# Patient Record
Sex: MALE | Race: WHITE | NOT HISPANIC OR LATINO | Employment: OTHER | URBAN - METROPOLITAN AREA
[De-identification: names, ages, dates, MRNs, and addresses within clinical notes are randomized per-mention and may not be internally consistent; named-entity substitution may affect disease eponyms.]

---

## 2017-08-07 ENCOUNTER — HOSPITAL ENCOUNTER (EMERGENCY)
Facility: MEDICAL CENTER | Age: 57
End: 2017-08-08
Attending: EMERGENCY MEDICINE
Payer: COMMERCIAL

## 2017-08-07 DIAGNOSIS — R51.9 ACUTE NONINTRACTABLE HEADACHE, UNSPECIFIED HEADACHE TYPE: ICD-10-CM

## 2017-08-07 DIAGNOSIS — H66.001 ACUTE SUPPURATIVE OTITIS MEDIA OF RIGHT EAR WITHOUT SPONTANEOUS RUPTURE OF TYMPANIC MEMBRANE, RECURRENCE NOT SPECIFIED: ICD-10-CM

## 2017-08-07 LAB
BASOPHILS # BLD AUTO: 0.4 % (ref 0–1.8)
BASOPHILS # BLD: 0.02 K/UL (ref 0–0.12)
EOSINOPHIL # BLD AUTO: 0.09 K/UL (ref 0–0.51)
EOSINOPHIL NFR BLD: 1.6 % (ref 0–6.9)
ERYTHROCYTE [DISTWIDTH] IN BLOOD BY AUTOMATED COUNT: 38.9 FL (ref 35.9–50)
HCT VFR BLD AUTO: 44.4 % (ref 42–52)
HGB BLD-MCNC: 15.7 G/DL (ref 14–18)
IMM GRANULOCYTES # BLD AUTO: 0.02 K/UL (ref 0–0.11)
IMM GRANULOCYTES NFR BLD AUTO: 0.4 % (ref 0–0.9)
LYMPHOCYTES # BLD AUTO: 0.97 K/UL (ref 1–4.8)
LYMPHOCYTES NFR BLD: 17.6 % (ref 22–41)
MCH RBC QN AUTO: 30.7 PG (ref 27–33)
MCHC RBC AUTO-ENTMCNC: 35.4 G/DL (ref 33.7–35.3)
MCV RBC AUTO: 86.7 FL (ref 81.4–97.8)
MONOCYTES # BLD AUTO: 0.61 K/UL (ref 0–0.85)
MONOCYTES NFR BLD AUTO: 11.1 % (ref 0–13.4)
NEUTROPHILS # BLD AUTO: 3.81 K/UL (ref 1.82–7.42)
NEUTROPHILS NFR BLD: 68.9 % (ref 44–72)
NRBC # BLD AUTO: 0 K/UL
NRBC BLD AUTO-RTO: 0 /100 WBC
PLATELET # BLD AUTO: 127 K/UL (ref 164–446)
PMV BLD AUTO: 9.1 FL (ref 9–12.9)
RBC # BLD AUTO: 5.12 M/UL (ref 4.7–6.1)
WBC # BLD AUTO: 5.5 K/UL (ref 4.8–10.8)

## 2017-08-07 PROCEDURE — 99283 EMERGENCY DEPT VISIT LOW MDM: CPT

## 2017-08-07 PROCEDURE — 85652 RBC SED RATE AUTOMATED: CPT

## 2017-08-07 PROCEDURE — 80048 BASIC METABOLIC PNL TOTAL CA: CPT

## 2017-08-07 PROCEDURE — 85025 COMPLETE CBC W/AUTO DIFF WBC: CPT

## 2017-08-07 PROCEDURE — 36415 COLL VENOUS BLD VENIPUNCTURE: CPT

## 2017-08-07 PROCEDURE — 86140 C-REACTIVE PROTEIN: CPT

## 2017-08-07 ASSESSMENT — PAIN SCALES - GENERAL: PAINLEVEL_OUTOF10: 0

## 2017-08-07 NOTE — ED AVS SNAPSHOT
Home Care Instructions                                                                                                                Leo Laughlin   MRN: [id number]    Department:  [facility], Emergency Dept   Date of Visit:  8/7/2017            [facility], Emergency Dept    [address] [place] [address]    Phone:  [phone]      You were seen by     Fred Keenan M.D.      Your Diagnosis Was     Acute nonintractable headache, unspecified headache type     R51       Follow-up Information     1. Follow up with Jackeline Jung D.O. In 2 days.    Specialty:  Family Medicine    Contact information    [address] [facility] [address]  [place] [address] [phone]          2. Follow up with [facility], Emergency Dept.    Specialty:  Emergency Medicine    Why:  immediately if symptoms worsen    Contact information    [address] [place] [address] [address] [phone]      Medication Information     Review all of your home medications and newly ordered medications with your primary doctor and/or pharmacist as soon as possible. Follow medication instructions as directed by your doctor and/or pharmacist.     Please keep your complete medication list with you and share with your physician. Update the information when medications are discontinued, doses are changed, or new medications (including over-the-counter products) are added; and carry medication information at all times in the event of emergency situations.               Medication List      START taking these medications        Instructions    Morning Afternoon Evening Bedtime    amoxicillin-clavulanate 875-125 MG Tabs   Commonly known as:  AUGMENTIN        Take 1 Tab by mouth 2 times a day for 10 days.   Dose:  1 Tab                        tramadol 50 MG Tabs   Commonly known as:  ULTRAM        Take 1-2 Tabs by mouth every four hours as needed.   Dose:   mg                         ASK your doctor about these medications        Instructions    Morning Afternoon Evening Bedtime    diazepam 5 MG Tabs   Commonly known as:  VALIUM        Take 1 Tab by mouth every 8 hours as needed (muscle spasm).   Dose:  5 mg                        hydrocodone-acetaminophen 5-500 MG Tabs   Commonly known as:  VICODIN        Take 1-2 Tabs by mouth every four hours as needed. Indications: Moderate to Moderately Severe Pain   Dose:  1-2 Tab                        ibuprofen 600 MG Tabs   Commonly known as:  MOTRIN        Take 1 Tab by mouth every 6 hours as needed for Mild Pain.   Dose:  600 mg                             Where to Get Your Medications      You can get these medications from any pharmacy     Bring a paper prescription for each of these medications    - amoxicillin-clavulanate 875-125 MG Tabs  - tramadol 50 MG Tabs            Procedures and tests performed during your visit     BASIC METABOLIC PANEL    CBC WITH DIFFERENTIAL    CRP QUANTITIVE (NON-CARDIAC)    ESTIMATED GFR    WESTERGREN SED RATE        Discharge Instructions       Otitis Media, Adult  Otitis media is redness, soreness, and puffiness (swelling) in the space just behind your eardrum (middle ear). It may be caused by allergies or infection. It often happens along with a cold.  HOME CARE  · Take your medicine as told. Finish it even if you start to feel better.  · Only take over-the-counter or prescription medicines for pain, discomfort, or fever as told by your doctor.  · Follow up with your doctor as told.  GET HELP IF:  · You have otitis media only in one ear, or bleeding from your nose, or both.  · You notice a lump on your neck.  · You are not getting better in 3-5 days.  · You feel worse instead of better.  GET HELP RIGHT AWAY IF:   · You have pain that is not helped with medicine.  · You have puffiness, redness, or pain around your ear.  · You get a stiff neck.  · You cannot move part of your face  (paralysis).  · You notice that the bone behind your ear hurts when you touch it.  MAKE SURE YOU:   · Understand these instructions.  · Will watch your condition.  · Will get help right away if you are not doing well or get worse.     This information is not intended to replace advice given to you by your health care provider. Make sure you discuss any questions you have with your health care provider.     Document Released: 06/05/2009 Document Revised: 01/08/2016 Document Reviewed: 07/15/2014  [employer] Interactive Patient Education ©2016 [employer] Inc.    General Headache Without Cause  A general headache is pain or discomfort felt around the head or neck area. The cause may not be found.   HOME CARE   · Keep all doctor visits.  · Only take medicines as told by your doctor.  · Lie down in a dark, quiet room when you have a headache.  · Keep a journal to find out if certain things bring on headaches. For example, write down:  ¨ What you eat and drink.  ¨ How much sleep you get.  ¨ Any change to your diet or medicines.  · Relax by getting a massage or doing other relaxing activities.  · Put ice or heat packs on the head and neck area as told by your doctor.  · Lessen stress.  · Sit up straight. Do not tighten (tense) your muscles.  · Quit smoking if you smoke.  · Lessen how much alcohol you drink.  · Lessen how much caffeine you drink, or stop drinking caffeine.  · Eat and sleep on a regular schedule.  · Get 7 to 9 hours of sleep, or as told by your doctor.  · Keep lights dim if bright lights bother you or make your headaches worse.  GET HELP RIGHT AWAY IF:   · Your headache becomes really bad.  · You have a fever.  · You have a stiff neck.  · You have trouble seeing.  · Your muscles are weak, or you lose muscle control.  · You lose your balance or have trouble walking.  · You feel like you will pass out (faint), or you pass out.  · You have really bad symptoms that are different than your first symptoms.  · You have  problems with the medicines given to you by your doctor.  · Your medicines do not work.  · Your headache feels different than the other headaches.  · You feel sick to your stomach (nauseous) or throw up (vomit).     This information is not intended to replace advice given to you by your health care provider. Make sure you discuss any questions you have with your health care provider.     Document Released: 09/26/2009 Document Revised: 05/03/2016 Document Reviewed: 12/07/2012  [employer] Interactive Patient Education ©2016 [employer]            Patient Information     Patient Information    Following emergency treatment: all patient requiring follow-up care must return either to a private physician or a clinic if your condition worsens before you are able to obtain further medical attention, please return to the emergency room.     Billing Information    At [facility], we work to make the billing process streamlined for our patients.  Our Representatives are here to answer any questions you may have regarding your hospital bill.  If you have insurance coverage and have supplied your insurance information to us, we will submit a claim to your insurer on your behalf.  Should you have any questions regarding your bill, we can be reached online or by phone as follows:  Online: You are able pay your bills online or live chat with our representatives about any billing questions you may have. We are here to help Monday - Friday from 8:00am to 7:30pm and 9:00am - 12:00pm on Saturdays.  Please visit [email or web address]  for more information.   Phone:  [phone] or [phone]    Please note that your emergency physician, surgeon, pathologist, radiologist, anesthesiologist, and other specialists are not employed by [facility] and will therefore bill separately for their services.  Please contact them directly for any questions concerning their bills at the numbers below:     Emergency  Physician Services:  [phone]  [facility]:  [phone]  [facility]:  [phone]  [facility]:  [phone]    1. Your final bill may vary from the amount quoted upon discharge if all procedures are not complete at that time, or if your doctor has additional procedures of which we are not aware. You will receive an additional bill if you return to the Emergency Department at [facility] for suture removal regardless of the facility of which the sutures were placed.     2. Please arrange for settlement of this account at the emergency registration.    3. All self-pay accounts are due in full at the time of treatment.  If you are unable to meet this obligation then payment is expected within 4-5 days.     4. If you have had radiology studies (CT, X-ray, Ultrasound, MRI), you have received a preliminary result during your emergency department visit. Please contact the radiology department [phone] to receive a copy of your final result. Please discuss the Final result with your primary physician or with the follow up physician provided.     Crisis Hotline:  [place] Crisis Hotline:  6-399-HHBEOAI or 1-591.458.1532  [place] Crisis Hotline:    1-820.320.1659 or 255-254-0949         ED Discharge Follow Up Questions    1. In order to provide you with very good care, we would like to follow up with a phone call in the next few days.  May we have your permission to contact you?     YES /  NO    2. What is the best phone number to call you? (       )_____-__________    3. What is the best time to call you?      Morning  /  Afternoon  /  Evening                   Patient Signature:  ____________________________________________________________    Date:  ____________________________________________________________

## 2017-08-07 NOTE — ED AVS SNAPSHOT
8/8/2017    Leo Laughlin  [address]  [place] [address]    Dear Leo:    [facility] wants to ensure your discharge home is safe and you or your loved ones have had all of your questions answered regarding your care after you leave the hospital.    Below is a list of resources and contact information should you have any questions regarding your hospital stay, follow-up instructions, or active medical symptoms.    Questions or Concerns Regarding… Contact   Medical Questions Related to Your Discharge  (7 days a week, 8am-5pm) Contact a Nurse Care Coordinator   [phone]   Medical Questions Not Related to Your Discharge  (24 hours a day / 7 days a week)  Contact the Nurse Health Line   [phone]    Medications or Discharge Instructions Refer to your discharge packet   or contact your [facility] Primary Care Provider   [phone]   Follow-up Appointment(s) Schedule your appointment via Vastari   or contact Scheduling [phone]   Billing Review your statement via Vastari  or contact Billing [phone]   Medical Records Review your records via Vastari   or contact Medical Records [phone]     You may receive a telephone call within two days of discharge. This call is to make certain you understand your discharge instructions and have the opportunity to have any questions answered. You can also easily access your medical information, test results and upcoming appointments via the Vastari free online health management tool. You can learn more and sign up at [email or web address]. For assistance setting up your Vastari account, please call [phone].    Once again, we want to ensure your discharge home is safe and that you have a clear understanding of any next steps in your care. If you have any questions or concerns, please do not hesitate to contact us, we are here for you. Thank you for choosing [facility] for your healthcare needs.    Sincerely,    Your [facility] Healthcare Team

## 2017-08-07 NOTE — ED AVS SNAPSHOT
MedMark Services Access Code: [email or web address]  Expires: 9/7/2017  2:51 AM    Your email address is not on file at [email or web address].  Email Addresses are required for you to sign up for [employer] Services, please contact [phone] to verify your personal information and to provide your email address prior to attempting to register for Med[employer] Services.    Leo Donovan Truman  [address] [place] [address]    MedMark Services  A secure, online tool to manage your health information     [email or web address]’s MedMark Services® is a secure, online tool that connects you to your personalized health information from the privacy of your home -- day or night - making it very easy for you to manage your healthcare. Once the activation process is completed, you can even access your medical information using the MedMark Services lg, which is available for free in the Apple Lg store or Google Play store.     To learn more about MedMark Services, visit [email or web address]    There are two levels of access available (as shown below):   My Chart Features  [facility] Primary Care Doctor [facility]  Specialists [facility]  Urgent  Care Non-[facility] Primary Care Doctor   Email your healthcare team securely and privately 24/7 X X X    Manage appointments: schedule your next appointment; view details of past/upcoming appointments X      Request prescription refills. X      View recent personal medical records, including lab and immunizations X X X X   View health record, including health history, allergies, medications X X X X   Read reports about your outpatient visits, procedures, consult and ER notes X X X X   See your discharge summary, which is a recap of your hospital and/or ER visit that includes your diagnosis, lab results, and care plan X X  X     How to register for MedMark Services:  Once your e-mail address has been verified, follow the following steps to sign up for MedMark Services.     1. Go to  [email or web address]  2. Click on the Sign Up Now box, which takes you to the New Member Sign Up page.  You will need to provide the following information:  a. Enter your Inte[employer] Access Code exactly as it appears at the top of this page. (You will not need to use this code after you’ve completed the sign-up process. If you do not sign up before the expiration date, you must request a new code.)   b. Enter your date of birth.   c. Enter your home email address.   d. Click Submit, and follow the next screen’s instructions.  3. Create a [employer] ID. This will be your Integromics login ID and cannot be changed, so think of one that is secure and easy to remember.  4. Create a Integromics password. You can change your password at any time.  5. Enter your Password Reset Question and Answer. This can be used at a later time if you forget your password.   6. Enter your e-mail address. This allows you to receive e-mail notifications when new information is available in Integromics.  7. Click Sign Up. You can now view your health information.    For assistance activating your Inte[employer]ics account, call [phone]

## 2017-08-08 VITALS
RESPIRATION RATE: 18 BRPM | HEART RATE: 71 BPM | DIASTOLIC BLOOD PRESSURE: 84 MMHG | HEIGHT: 70 IN | SYSTOLIC BLOOD PRESSURE: 128 MMHG | WEIGHT: 271.83 LBS | BODY MASS INDEX: 38.92 KG/M2 | TEMPERATURE: 96.9 F | OXYGEN SATURATION: 91 %

## 2017-08-08 LAB
ANION GAP SERPL CALC-SCNC: 8 MMOL/L (ref 0–11.9)
BUN SERPL-MCNC: 12 MG/DL (ref 8–22)
CALCIUM SERPL-MCNC: 9.1 MG/DL (ref 8.4–10.2)
CHLORIDE SERPL-SCNC: 102 MMOL/L (ref 96–112)
CO2 SERPL-SCNC: 26 MMOL/L (ref 20–33)
CREAT SERPL-MCNC: 1.01 MG/DL (ref 0.5–1.4)
CRP SERPL HS-MCNC: 2.06 MG/DL (ref 0–0.75)
ERYTHROCYTE [SEDIMENTATION RATE] IN BLOOD BY WESTERGREN METHOD: 20 MM/HOUR (ref 0–20)
GFR SERPL CREATININE-BSD FRML MDRD: >60 ML/MIN/1.73 M 2
GLUCOSE SERPL-MCNC: 150 MG/DL (ref 65–99)
POTASSIUM SERPL-SCNC: 3.7 MMOL/L (ref 3.6–5.5)
SODIUM SERPL-SCNC: 136 MMOL/L (ref 135–145)

## 2017-08-08 PROCEDURE — 700102 HCHG RX REV CODE 250 W/ 637 OVERRIDE(OP): Performed by: EMERGENCY MEDICINE

## 2017-08-08 PROCEDURE — A9270 NON-COVERED ITEM OR SERVICE: HCPCS | Performed by: EMERGENCY MEDICINE

## 2017-08-08 RX ORDER — AMOXICILLIN AND CLAVULANATE POTASSIUM 875; 125 MG/1; MG/1
1 TABLET, FILM COATED ORAL 2 TIMES DAILY
Qty: 20 TAB | Refills: 0 | Status: SHIPPED | OUTPATIENT
Start: 2017-08-08 | End: 2017-08-18

## 2017-08-08 RX ORDER — AMOXICILLIN AND CLAVULANATE POTASSIUM 875; 125 MG/1; MG/1
1 TABLET, FILM COATED ORAL ONCE
Status: COMPLETED | OUTPATIENT
Start: 2017-08-08 | End: 2017-08-08

## 2017-08-08 RX ORDER — TRAMADOL HYDROCHLORIDE 50 MG/1
50-100 TABLET ORAL EVERY 4 HOURS PRN
Qty: 31 TAB | Refills: 0 | Status: SHIPPED | OUTPATIENT
Start: 2017-08-08 | End: 2021-06-22

## 2017-08-08 RX ADMIN — AMOXICILLIN AND CLAVULANATE POTASSIUM 1 TABLET: 875; 125 TABLET, FILM COATED ORAL at 02:53

## 2017-08-08 ASSESSMENT — PAIN SCALES - GENERAL: PAINLEVEL_OUTOF10: 2

## 2017-08-08 NOTE — DISCHARGE INSTRUCTIONS
Otitis Media, Adult  Otitis media is redness, soreness, and puffiness (swelling) in the space just behind your eardrum (middle ear). It may be caused by allergies or infection. It often happens along with a cold.  HOME CARE  · Take your medicine as told. Finish it even if you start to feel better.  · Only take over-the-counter or prescription medicines for pain, discomfort, or fever as told by your doctor.  · Follow up with your doctor as told.  GET HELP IF:  · You have otitis media only in one ear, or bleeding from your nose, or both.  · You notice a lump on your neck.  · You are not getting better in 3-5 days.  · You feel worse instead of better.  GET HELP RIGHT AWAY IF:   · You have pain that is not helped with medicine.  · You have puffiness, redness, or pain around your ear.  · You get a stiff neck.  · You cannot move part of your face (paralysis).  · You notice that the bone behind your ear hurts when you touch it.  MAKE SURE YOU:   · Understand these instructions.  · Will watch your condition.  · Will get help right away if you are not doing well or get worse.     This information is not intended to replace advice given to you by your health care provider. Make sure you discuss any questions you have with your health care provider.     Document Released: 06/05/2009 Document Revised: 01/08/2016 Document Reviewed: 07/15/2014  [employer] Interactive Patient Education ©2016 [employer].    General Headache Without Cause  A general headache is pain or discomfort felt around the head or neck area. The cause may not be found.   HOME CARE   · Keep all doctor visits.  · Only take medicines as told by your doctor.  · Lie down in a dark, quiet room when you have a headache.  · Keep a journal to find out if certain things bring on headaches. For example, write down:  ¨ What you eat and drink.  ¨ How much sleep you get.  ¨ Any change to your diet or medicines.  · Relax by getting a massage or doing other relaxing  activities.  · Put ice or heat packs on the head and neck area as told by your doctor.  · Lessen stress.  · Sit up straight. Do not tighten (tense) your muscles.  · Quit smoking if you smoke.  · Lessen how much alcohol you drink.  · Lessen how much caffeine you drink, or stop drinking caffeine.  · Eat and sleep on a regular schedule.  · Get 7 to 9 hours of sleep, or as told by your doctor.  · Keep lights dim if bright lights bother you or make your headaches worse.  GET HELP RIGHT AWAY IF:   · Your headache becomes really bad.  · You have a fever.  · You have a stiff neck.  · You have trouble seeing.  · Your muscles are weak, or you lose muscle control.  · You lose your balance or have trouble walking.  · You feel like you will pass out (faint), or you pass out.  · You have really bad symptoms that are different than your first symptoms.  · You have problems with the medicines given to you by your doctor.  · Your medicines do not work.  · Your headache feels different than the other headaches.  · You feel sick to your stomach (nauseous) or throw up (vomit).     This information is not intended to replace advice given to you by your health care provider. Make sure you discuss any questions you have with your health care provider.     Document Released: 09/26/2009 Document Revised: 05/03/2016 Document Reviewed: 12/07/2012  [employer] Interactive Patient Education ©2016 [employer].

## 2017-08-08 NOTE — ED PROVIDER NOTES
ED Provider Note    ED Provider Note      Primary care provider: Jackeline Jung D.O.    CHIEF COMPLAINT  Chief Complaint   Patient presents with   • Sent from Urgent Care   • Fever       HPI  Leo Laughlin is a 56 y.o. male who presents to the Emergency Department chief complaint of fever and headache. Patient reports he was seen in urgent care center earlier they were concerned with possible temporal arteritis and is instructed to present here for further evaluation. Patient's been having intermittent headaches for the last 2 days his headache are primarily right-sided breast retrobulbar with some radiation in the right temporal area of the right ear. Patient had no dizziness no altered mental status he reports minimal intermittent neck pain not currently experiencing this. He's had no vision changes no hearing changes he reports no jaw claudication or pain with eating he's had no pain over the right side of his head or forehead/face. Minor nauseous and has no vomiting no cough congestion chest pain shortness of breath no abdominal pain constipation diarrhea no other acute concerns at this time.      REVIEW OF SYSTEMS  10 systems reviewed and otherwise negative, pertinent positives and negatives listed in the history of present illness.      PAST MEDICAL HISTORY    none    SURGICAL HISTORY   has past surgical history that includes arthroscopy, knee.    SOCIAL HISTORY  Social History   Substance Use Topics   • Smoking status: Former Smoker -- 2.00 packs/day for 30 years     Types: Cigarettes   • Smokeless tobacco: None      Comment: 13 years ago   • Alcohol Use: No      History   Drug Use No       FAMILY HISTORY  Non-Contributory    CURRENT MEDICATIONS  Home Medications     Reviewed by Rolando Eastman R.N. (Registered Nurse) on 08/07/17 at 2023  Med List Status: Complete    Medication Last Dose Status    diazepam (VALIUM) 5 MG TABS not taking Active    hydrocodone-acetaminophen (VICODIN) 5-500 MG TABS not  "taking Active    ibuprofen (MOTRIN) 600 MG TABS  Active                ALLERGIES  No Known Allergies    PHYSICAL EXAM  VITAL SIGNS: /84 mmHg  Pulse 75  Temp(Src) 36.1 °C (96.9 °F)  Resp 18  Ht 1.778 m (5' 10\")  Wt 123.3 kg (271 lb 13.2 oz)  BMI 39.00 kg/m2  SpO2 94%  Pulse ox interpretation: I interpret this pulse ox as normal.  Constitutional: Alert and oriented x 3, Distress  HEENT: Atraumatic normocephalic, no tenderness to palpation of the right temple or right facial structures, pupils are equal round reactive to light extraocular movements are intact no scleral injection. The nares shows minor congestion and rhinorrhea, external ears are normal, the left tympanic membrane is somewhat retracted however clear reflective left external auditory canal unremarkable. The right tympanic membrane is retracted and opacified somewhat bulging with erythematous external auditory canal, mouth shows moist mucous membranes, minimal posterior pharyngeal erythema and tonsillar edema  Neck: Supple, no JVD no tracheal deviation  Cardiovascular: Regular rate and rhythm no murmur rub or gallop 2+ pulses peripherally x4  Thorax & Lungs: No respiratory distress, no wheezes rales or rhonchi, No chest tenderness.   GI: Soft nontender nondistended positive bowel sounds, no peritoneal signs  Skin: Warm dry no acute rash or lesion  Musculoskeletal: Moving all extremities with full range and 5 of 5 strength, no acute  deformity  Neurologic: Cranial nerves III through XII are grossly intact, no sensory deficit, no cerebellar dysfunction   Psychiatric: Appropriate affect for situation at this time      DIAGNOSTIC STUDIES / PROCEDURES  LABS  Results for orders placed or performed during the hospital encounter of 08/07/17   CBC WITH DIFFERENTIAL   Result Value Ref Range    WBC 5.5 4.8 - 10.8 K/uL    RBC 5.12 4.70 - 6.10 M/uL    Hemoglobin 15.7 14.0 - 18.0 g/dL    Hematocrit 44.4 42.0 - 52.0 %    MCV 86.7 81.4 - 97.8 fL    MCH 30.7 " 27.0 - 33.0 pg    MCHC 35.4 (H) 33.7 - 35.3 g/dL    RDW 38.9 35.9 - 50.0 fL    Platelet Count 127 (L) 164 - 446 K/uL    MPV 9.1 9.0 - 12.9 fL    Neutrophils-Polys 68.90 44.00 - 72.00 %    Lymphocytes 17.60 (L) 22.00 - 41.00 %    Monocytes 11.10 0.00 - 13.40 %    Eosinophils 1.60 0.00 - 6.90 %    Basophils 0.40 0.00 - 1.80 %    Immature Granulocytes 0.40 0.00 - 0.90 %    Nucleated RBC 0.00 /100 WBC    Neutrophils (Absolute) 3.81 1.82 - 7.42 K/uL    Lymphs (Absolute) 0.97 (L) 1.00 - 4.80 K/uL    Monos (Absolute) 0.61 0.00 - 0.85 K/uL    Eos (Absolute) 0.09 0.00 - 0.51 K/uL    Baso (Absolute) 0.02 0.00 - 0.12 K/uL    Immature Granulocytes (abs) 0.02 0.00 - 0.11 K/uL    NRBC (Absolute) 0.00 K/uL   BASIC METABOLIC PANEL   Result Value Ref Range    Sodium 136 135 - 145 mmol/L    Potassium 3.7 3.6 - 5.5 mmol/L    Chloride 102 96 - 112 mmol/L    Co2 26 20 - 33 mmol/L    Glucose 150 (H) 65 - 99 mg/dL    Bun 12 8 - 22 mg/dL    Creatinine 1.01 0.50 - 1.40 mg/dL    Calcium 9.1 8.4 - 10.2 mg/dL    Anion Gap 8.0 0.0 - 11.9   CRP QUANTITIVE (NON-CARDIAC)   Result Value Ref Range    Stat C-Reactive Protein 2.06 (H) 0.00 - 0.75 mg/dL   WESTERGREN SED RATE   Result Value Ref Range    Sed Rate Westergren 20 0 - 20 mm/hour   ESTIMATED GFR   Result Value Ref Range    GFR If African American >60 >60 mL/min/1.73 m 2    GFR If Non African American >60 >60 mL/min/1.73 m 2       All labs reviewed by me.          COURSE & MEDICAL DECISION MAKING  Pertinent Labs & Imaging studies reviewed. (See chart for details)        Medical Decision Making: Patient sent from urgent care for evaluation possible temporal arteritis. Patient has no changes of the eye no changes of the vision he has no jaw claudication no tenderness over the right temporal artery distribution. CRP is very slightly elevated ESR is normal. Patient does have evidence of an acute right otitis media likely responsible for her symptoms. We discussed further evaluation of temporal  "arteritis would be performed by temporal artery biopsy which I do not feel is indicated at this time. Patient is also had some headache and some fever. Likely both resulting from acute otitis media no nuchal rigidity or other meningeal signs at this time we discussed lumbar puncture however patient and myself agree that this would pose more risk than benefit at this time. Patient understands to return immediately should he have any neck stiffness worsening headache altered mental status vision changes jaw claudication any other acute concerns he is otherwise treated with 1st dose of Augmentin here and will be discharged with the same. Repeat exam and vital signs benign patient understanding and agreeable and appreciative treatment discharged home in stable condition.  /84 mmHg  Pulse 71  Temp(Src) 36.1 °C (96.9 °F)  Resp 18  Ht 1.778 m (5' 10\")  Wt 123.3 kg (271 lb 13.2 oz)  BMI 39.00 kg/m2  SpO2 91%    GABE BatesO.  [address] [facility] [address]  [facility] [id number] [phone]    In 2 days      [facility], Emergency Dept  [address]  [facility] [address] [phone]    immediately if symptoms worsen            FINAL IMPRESSION  1. Acute nonintractable headache, unspecified headache type    2. Acute suppurative otitis media of right ear without spontaneous rupture of tympanic membrane, recurrence not specified           This dictation has been created using voice recognition software and/or scribes. The accuracy of the dictation is limited by the abilities of the software and the expertise of the scribes. I expect there may be some errors of grammar and possibly content. I made every attempt to manually correct the errors within my dictation. However, errors related to voice recognition software and/or scribes may still exist and should be interpreted within the appropriate context.              "

## 2017-08-08 NOTE — ED NOTES
Discharge instructions provided. Rx x2 given and educated on how and when to take medications,  Pt verbalized the understanding of discharge instructions to follow up with PCP and to return to ER if condition worsens.  Pt ambulated out of ER without difficulty.

## 2017-08-08 NOTE — ED NOTES
Pt bib family for fever and sweats since last night. Pt seen at  earlier today where he was advised to be seen in the ED for Temporal Arthritis rule out.

## 2018-11-22 PROBLEM — D70.9 NEUTROPENIA (HCC): Status: ACTIVE | Noted: 2018-11-22

## 2020-06-02 PROBLEM — G47.33 OSA (OBSTRUCTIVE SLEEP APNEA): Status: ACTIVE | Noted: 2020-06-02

## 2020-06-02 PROBLEM — M19.90 ARTHRITIS: Status: ACTIVE | Noted: 2020-06-02

## 2020-06-02 PROBLEM — Z86.010 HX OF COLONIC POLYPS: Status: ACTIVE | Noted: 2020-06-02

## 2020-06-02 PROBLEM — Z86.0100 HX OF COLONIC POLYPS: Status: ACTIVE | Noted: 2020-06-02

## 2020-06-02 PROBLEM — M54.50 CHRONIC LOW BACK PAIN WITHOUT SCIATICA: Status: ACTIVE | Noted: 2020-06-02

## 2020-06-02 PROBLEM — G89.29 CHRONIC LOW BACK PAIN WITHOUT SCIATICA: Status: ACTIVE | Noted: 2020-06-02

## 2020-06-02 PROBLEM — E66.811 OBESITY (BMI 30.0-34.9): Status: ACTIVE | Noted: 2020-06-02

## 2020-06-02 PROBLEM — E66.9 OBESITY (BMI 30.0-34.9): Status: ACTIVE | Noted: 2020-06-02

## 2021-06-21 ENCOUNTER — TELEPHONE (OUTPATIENT)
Dept: HEALTH INFORMATION MANAGEMENT | Facility: OTHER | Age: 61
End: 2021-06-21

## 2021-06-22 ENCOUNTER — OFFICE VISIT (OUTPATIENT)
Dept: MEDICAL GROUP | Facility: PHYSICIAN GROUP | Age: 61
End: 2021-06-22
Payer: COMMERCIAL

## 2021-06-22 VITALS
TEMPERATURE: 97.3 F | DIASTOLIC BLOOD PRESSURE: 84 MMHG | HEART RATE: 62 BPM | RESPIRATION RATE: 20 BRPM | HEIGHT: 70 IN | BODY MASS INDEX: 43.61 KG/M2 | SYSTOLIC BLOOD PRESSURE: 108 MMHG | WEIGHT: 304.6 LBS | OXYGEN SATURATION: 93 %

## 2021-06-22 DIAGNOSIS — Z00.00 PHYSICAL EXAM, ANNUAL: ICD-10-CM

## 2021-06-22 DIAGNOSIS — D70.9 NEUTROPENIA, UNSPECIFIED TYPE (HCC): ICD-10-CM

## 2021-06-22 DIAGNOSIS — R73.03 PREDIABETES: ICD-10-CM

## 2021-06-22 DIAGNOSIS — Z12.2 ENCOUNTER FOR SCREENING FOR LUNG CANCER: ICD-10-CM

## 2021-06-22 DIAGNOSIS — E78.2 MIXED HYPERLIPIDEMIA: ICD-10-CM

## 2021-06-22 PROCEDURE — 99203 OFFICE O/P NEW LOW 30 MIN: CPT | Performed by: PHYSICIAN ASSISTANT

## 2021-06-22 ASSESSMENT — FIBROSIS 4 INDEX: FIB4 SCORE: 2.05

## 2021-06-22 ASSESSMENT — PATIENT HEALTH QUESTIONNAIRE - PHQ9: CLINICAL INTERPRETATION OF PHQ2 SCORE: 0

## 2021-06-22 NOTE — PROGRESS NOTES
CC:    Chief Complaint   Patient presents with   • Establish Care       HISTORY OF THE PRESENT ILLNESS: Patient is a 60 y.o. male presenting to establish primary care     1. Pt has DDD in lumbar region and recently just had epidural injection. Working very well.   2. Patient's labs from about a year ago indicate history of neutropenia.  His platelets and hemoglobin/hematocrit were normal.  Negative hepatitis C antibody screen      No problem-specific Assessment & Plan notes found for this encounter.    Allergies: Patient has no known allergies.    No current [facility]-ordered outpatient medications on file.     No current [facility]-ordered facility-administered medications on file.       Past Medical History:   Diagnosis Date   • Arthritis    • Neutropenia (HCC) 11/22/2018       Past Surgical History:   Procedure Laterality Date   • ROBOTIC LAPAROSCOPIC CHOLECYSTECTOMY  01/22/2019   • ARTHROSCOPY, KNEE      left   • CARPAL TUNNEL RELEASE Bilateral    • CHOLECYSTECTOMY         Social History     Tobacco Use   • Smoking status: Former Smoker     Packs/day: 2.00     Years: 30.00     Pack years: 60.00     Types: Cigarettes   • Smokeless tobacco: Never Used   • Tobacco comment: 13 years ago   Vaping Use   • Vaping Use: Never used   Substance Use Topics   • Alcohol use: No   • Drug use: No       Social History     Social History Narrative    ** Merged History Encounter **            Family History   Problem Relation Age of Onset   • Cancer Father    • Diabetes Father    • Diabetes Sister    • Diabetes Brother    • Cancer Brother    • Diabetes Sister    • Diabetes Sister    • Diabetes Brother        ROS:     - Constitutional: Negative for fever, chills, unexpected weight change, and fatigue/generalized weakness.     - HEENT: Negative for headaches, vision changes, hearing changes, ear pain, ear discharge, rhinorrhea, sinus congestion, sore throat, and neck pain.      - Respiratory:Positive for SOB with exertion.  Negative for cough,  "sputum production, chest congestion, wheezing, and crackles.      - Cardiovascular: Negative for chest pain, palpitations, orthopnea, and bilateral lower extremity edema.     - Gastrointestinal: Negative for heartburn, nausea, vomiting, abdominal pain, hematochezia, melena, diarrhea, constipation, and greasy/foul-smelling stools.     - Genitourinary: Negative for dysuria, polyuria, hematuria, pyuria, urinary urgency, and urinary incontinence.     - Musculoskeletal:Positive for LBP.  Negative for myalgias,  and joint pain.     - Skin:Positive for flaky rash.  Negative for rash, itching, cyanotic skin color change.     - Neurological: Negative for dizziness, tingling, tremors, focal sensory deficit, focal weakness and headaches.     - Endo/Heme/Allergies: Does not bruise/bleed easily.     - Psychiatric/Behavioral: Negative for depression, suicidal/homicidal ideation and memory loss.            .      Exam: /84   Pulse 62   Temp 36.3 °C (97.3 °F) (Temporal)   Resp 20   Ht 1.778 m (5' 10\")   Wt (!) 138 kg (304 lb 9.6 oz)   SpO2 93%  Body mass index is 43.71 kg/m².    General: Normal appearing. No acute distress.  Skin: Warm and dry.  No obvious lesions.  HEENT: Normocephalic. Eyes conjunctiva clear lids without ptosis, ears normal shape and contour  Cardiovascular: Regular rate and rhythm without murmur.   Respiratory: Clear to auscultation bilaterally, no rhonchi wheezing or rales.  Neurologic: Grossly nonfocal, A&O x3, gait normal,  Musculoskeletal: No deformity or swelling.   Extremities: No extremity cyanosis, clubbing, or edema.  Psych: Normal mood and affect. Judgment and insight is normal.    Please note that this dictation was created using voice recognition software. I have made every reasonable attempt to correct obvious errors, but I expect that there are errors of grammar and possibly content that I did not discover before finalizing the note.      Assessment/Plan   1. Physical exam, " annual  Annual labs printed.  - CBC WITH DIFFERENTIAL; Future  - Comp Metabolic Panel; Future  - HEMOGLOBIN A1C; Future  - Lipid Profile; Future  - TSH WITH REFLEX TO FT4; Future    2. Encounter for screening for lung cancer  Referral for lung cancer screening placed  - REFERRAL TO LUNG CANCER SCREENING PROGRAM    3. Neutropenia, unspecified type (HCC)  We will check this again and review at next visit.    4. Prediabetes  Review A1c at next visit    5. Mixed hyperlipidemia  Review lipid panel at next visit.

## 2021-06-23 ENCOUNTER — TELEPHONE (OUTPATIENT)
Dept: HEMATOLOGY ONCOLOGY | Facility: MEDICAL CENTER | Age: 61
End: 2021-06-23

## 2021-06-23 NOTE — TELEPHONE ENCOUNTER
Received referral to lung cancer screening program.  Chart review to assess for lung cancer screening program eligibility.   1. Age 55-77 yrs of age? Yes 60 y.o.  2. 30 pack year hx of smoking, or greater? Yes 2 [id number]= [id number] hx  3. Current smoker or if quit, has pt quit within last 15 yrs?Yes  Quit 2008  4. Any signs or symptoms of lung cancer? None noted  5. Previous history of lung cancer? None noted  6. Chest CT within past 12 mos.? None noted  Patient does meet eligibility criteria. [facility] scheduling notified to schedule the shared decision making visit.

## 2021-06-25 ENCOUNTER — HOSPITAL ENCOUNTER (OUTPATIENT)
Dept: LAB | Facility: MEDICAL CENTER | Age: 61
End: 2021-06-25
Attending: PHYSICIAN ASSISTANT
Payer: COMMERCIAL

## 2021-06-25 DIAGNOSIS — Z00.00 PHYSICAL EXAM, ANNUAL: ICD-10-CM

## 2021-06-25 LAB
ALBUMIN SERPL BCP-MCNC: 4.1 G/DL (ref 3.2–4.9)
ALBUMIN/GLOB SERPL: 1.4 G/DL
ALP SERPL-CCNC: 46 U/L (ref 30–99)
ALT SERPL-CCNC: 100 U/L (ref 2–50)
ANION GAP SERPL CALC-SCNC: 9 MMOL/L (ref 7–16)
AST SERPL-CCNC: 76 U/L (ref 12–45)
BASOPHILS # BLD AUTO: 0.9 % (ref 0–1.8)
BASOPHILS # BLD: 0.03 K/UL (ref 0–0.12)
BILIRUB SERPL-MCNC: 0.5 MG/DL (ref 0.1–1.5)
BUN SERPL-MCNC: 13 MG/DL (ref 8–22)
CALCIUM SERPL-MCNC: 9.1 MG/DL (ref 8.5–10.5)
CHLORIDE SERPL-SCNC: 107 MMOL/L (ref 96–112)
CHOLEST SERPL-MCNC: 201 MG/DL (ref 100–199)
CO2 SERPL-SCNC: 28 MMOL/L (ref 20–33)
CREAT SERPL-MCNC: 0.89 MG/DL (ref 0.5–1.4)
EOSINOPHIL # BLD AUTO: 0.08 K/UL (ref 0–0.51)
EOSINOPHIL NFR BLD: 2.3 % (ref 0–6.9)
ERYTHROCYTE [DISTWIDTH] IN BLOOD BY AUTOMATED COUNT: 45.1 FL (ref 35.9–50)
EST. AVERAGE GLUCOSE BLD GHB EST-MCNC: 143 MG/DL
FASTING STATUS PATIENT QL REPORTED: NORMAL
GLOBULIN SER CALC-MCNC: 2.9 G/DL (ref 1.9–3.5)
GLUCOSE SERPL-MCNC: 100 MG/DL (ref 65–99)
HBA1C MFR BLD: 6.6 % (ref 4–5.6)
HCT VFR BLD AUTO: 51.3 % (ref 42–52)
HDLC SERPL-MCNC: 34 MG/DL
HGB BLD-MCNC: 16.7 G/DL (ref 14–18)
IMM GRANULOCYTES # BLD AUTO: 0.01 K/UL (ref 0–0.11)
IMM GRANULOCYTES NFR BLD AUTO: 0.3 % (ref 0–0.9)
LDLC SERPL CALC-MCNC: 132 MG/DL
LYMPHOCYTES # BLD AUTO: 1.04 K/UL (ref 1–4.8)
LYMPHOCYTES NFR BLD: 30.5 % (ref 22–41)
MCH RBC QN AUTO: 31.5 PG (ref 27–33)
MCHC RBC AUTO-ENTMCNC: 32.6 G/DL (ref 33.7–35.3)
MCV RBC AUTO: 96.6 FL (ref 81.4–97.8)
MONOCYTES # BLD AUTO: 0.47 K/UL (ref 0–0.85)
MONOCYTES NFR BLD AUTO: 13.8 % (ref 0–13.4)
NEUTROPHILS # BLD AUTO: 1.78 K/UL (ref 1.82–7.42)
NEUTROPHILS NFR BLD: 52.2 % (ref 44–72)
NRBC # BLD AUTO: 0 K/UL
NRBC BLD-RTO: 0 /100 WBC
PLATELET # BLD AUTO: 172 K/UL (ref 164–446)
PMV BLD AUTO: 9.4 FL (ref 9–12.9)
POTASSIUM SERPL-SCNC: 4.3 MMOL/L (ref 3.6–5.5)
PROT SERPL-MCNC: 7 G/DL (ref 6–8.2)
RBC # BLD AUTO: 5.31 M/UL (ref 4.7–6.1)
SODIUM SERPL-SCNC: 144 MMOL/L (ref 135–145)
TRIGL SERPL-MCNC: 173 MG/DL (ref 0–149)
TSH SERPL DL<=0.005 MIU/L-ACNC: 2.62 UIU/ML (ref 0.38–5.33)
WBC # BLD AUTO: 3.4 K/UL (ref 4.8–10.8)

## 2021-06-25 PROCEDURE — 80053 COMPREHEN METABOLIC PANEL: CPT

## 2021-06-25 PROCEDURE — 80061 LIPID PANEL: CPT

## 2021-06-25 PROCEDURE — 36415 COLL VENOUS BLD VENIPUNCTURE: CPT

## 2021-06-25 PROCEDURE — 83036 HEMOGLOBIN GLYCOSYLATED A1C: CPT

## 2021-06-25 PROCEDURE — 84443 ASSAY THYROID STIM HORMONE: CPT

## 2021-06-25 PROCEDURE — 85025 COMPLETE CBC W/AUTO DIFF WBC: CPT

## 2021-07-13 ENCOUNTER — OFFICE VISIT (OUTPATIENT)
Dept: MEDICAL GROUP | Facility: PHYSICIAN GROUP | Age: 61
End: 2021-07-13
Payer: COMMERCIAL

## 2021-07-13 VITALS
BODY MASS INDEX: 43.72 KG/M2 | HEART RATE: 64 BPM | OXYGEN SATURATION: 92 % | TEMPERATURE: 98 F | RESPIRATION RATE: 20 BRPM | HEIGHT: 70 IN | DIASTOLIC BLOOD PRESSURE: 78 MMHG | WEIGHT: 305.4 LBS | SYSTOLIC BLOOD PRESSURE: 108 MMHG

## 2021-07-13 DIAGNOSIS — E66.01 CLASS 3 SEVERE OBESITY DUE TO EXCESS CALORIES WITH SERIOUS COMORBIDITY AND BODY MASS INDEX (BMI) OF 40.0 TO 44.9 IN ADULT (HCC): ICD-10-CM

## 2021-07-13 DIAGNOSIS — D70.9 NEUTROPENIA, UNSPECIFIED TYPE (HCC): ICD-10-CM

## 2021-07-13 DIAGNOSIS — G47.33 OBSTRUCTIVE SLEEP APNEA SYNDROME: ICD-10-CM

## 2021-07-13 DIAGNOSIS — Z00.00 PHYSICAL EXAM, ANNUAL: ICD-10-CM

## 2021-07-13 DIAGNOSIS — E11.9 TYPE 2 DIABETES MELLITUS WITHOUT COMPLICATION, WITHOUT LONG-TERM CURRENT USE OF INSULIN (HCC): ICD-10-CM

## 2021-07-13 DIAGNOSIS — R79.89 ELEVATED LFTS: ICD-10-CM

## 2021-07-13 PROBLEM — E66.813 CLASS 3 SEVERE OBESITY DUE TO EXCESS CALORIES WITHOUT SERIOUS COMORBIDITY WITH BODY MASS INDEX (BMI) OF 40.0 TO 44.9 IN ADULT (HCC): Status: ACTIVE | Noted: 2020-06-02

## 2021-07-13 PROCEDURE — 99214 OFFICE O/P EST MOD 30 MIN: CPT | Performed by: PHYSICIAN ASSISTANT

## 2021-07-13 ASSESSMENT — FIBROSIS 4 INDEX: FIB4 SCORE: 2.74

## 2021-07-13 NOTE — PROGRESS NOTES
CC:  Chief Complaint   Patient presents with   • Lab Results       HISTORY OF PRESENT ILLNESS: Patient is a 60 y.o. male established patient presenting for annual PE.   1. Pt has lung CA screening later this week.   2. Pt's A1C returned at 6.6%. not currently on medications for DM.   3. Pt's LFTs elevated. In reviewing his previous labs they have been elevated for past 3 years. Pt never knew about this and never previously discussed. Had negative Hep C screen last year.   4. Pt's WBCs are low and have been low for several years. Neutrophils mildly decreased as well. No previously hx of blood disorders.     No problem-specific Assessment & Plan notes found for this encounter.    The 10-year ASCVD risk score ([place] MANAS Jr., et al., 2013) is: 9.8%    Patient Active Problem List    Diagnosis Date Noted   • Obstructive sleep apnea syndrome 06/02/2020   • Chronic low back pain without sciatica 06/02/2020   • Hx of colonic polyps 06/02/2020   • Arthritis 06/02/2020   • Obesity (BMI 30.0-34.9) 06/02/2020   • Neutropenia (HCC) 11/22/2018      Allergies:Patient has no known allergies.    No current outpatient medications on file.     No current facility-administered medications for this visit.       Social History     Tobacco Use   • Smoking status: Former Smoker     Packs/day: 2.00     Years: 30.00     Pack years: 60.00     Types: Cigarettes   • Smokeless tobacco: Never Used   • Tobacco comment: 13 years ago   Vaping Use   • Vaping Use: Never used   Substance Use Topics   • Alcohol use: No   • Drug use: No     Social History     Social History Narrative    ** Merged History Encounter **            Family History   Problem Relation Age of Onset   • Cancer Father    • Diabetes Father    • Diabetes Sister    • Diabetes Brother    • Cancer Brother    • Diabetes Sister    • Diabetes Sister    • Diabetes Brother         ROS:     - Constitutional:  Negative for fever, chills, unexpected weight change, and fatigue/generalized  "weakness.    - HEENT:  Negative for headaches, vision changes, hearing changes, ear pain, ear discharge, rhinorrhea, sinus congestion, sore throat, and neck pain.      - Respiratory: Positive for SOB with exertion. Negative for cough, sputum production, chest congestion,  wheezing, and crackles.      - Cardiovascular: Negative for chest pain, palpitations, orthopnea, and bilateral lower extremity edema.     - Gastrointestinal: Negative for heartburn, nausea, vomiting, abdominal pain, hematochezia, melena, diarrhea, constipation, and greasy/foul-smelling stools.     - Genitourinary: Negative for dysuria, polyuria, hematuria, pyuria, urinary urgency, and urinary incontinence.     - Musculoskeletal: Positive for LBP. Negative for myalgias, and joint pain.     - Skin: Negative for rash, itching, cyanotic skin color change.     - Neurological: Negative for dizziness, tingling, tremors, focal sensory deficit, focal weakness and headaches.     - Endo/Heme/Allergies: Does not bruise/bleed easily.     - Psychiatric/Behavioral: Negative for depression, suicidal/homicidal ideation and memory loss.              Exam:    /78   Pulse 64   Temp 36.7 °C (98 °F) (Temporal)   Resp 20   Ht 1.778 m (5' 10\")   Wt (!) 139 kg (305 lb 6.4 oz)   SpO2 92%  Body mass index is 43.82 kg/m².    General:  Obese male in NAD  Head is grossly normal.  Neck: Supple. Thyroid is not enlarged. No lymphadenopathy  Pulmonary: Clear to auscultation. No ronchi, wheezing or rales  Cardiac: Regular rate and rhythm. No murmurs.  Skin: Warm and dry. No obvious lesions  Extremities: Positive for bilateral 1+ pitting edema  Neuro: Normal muscle tone. Gait normal. Alert and oriented.  Psych: Normal mood and affect      Please note that this dictation was created using voice recognition software. I have made every reasonable attempt to correct obvious errors, but I expect that there are errors of grammar and possibly content that I did not discover " before finalizing the note.    LABS: 7/13/2021: Results reviewed and discussed with the patient, questions answered.    Assessment/Plan:  1. Neutropenia, unspecified type (HCC)  This is a chronic trend for him. Will check viral etiologies in setting of elevated LFTs and get hem/onc consult.   - REFERRAL TO HEMATOLOGY ONCOLOGY    2. Elevated LFTs  Will f/u with results. I explained to him that it is most likely fatty liver.   - US-RUQ; Future  - HEP A AB TOTAL; Future  - HEP B CORE AB TEST,TOTAL; Future    3. Type 2 diabetes mellitus without complication, without long-term current use of insulin (HCC)  I am going to hold off of medication for treatment of this since it is so mild. He will work on eating better and exercising more. Recheck in 3-4 months    4. Class 3 severe obesity due to excess calories with serious comorbidity and body mass index (BMI) of 40.0 to 44.9 in adult (HCC)  Continue to monitor.     5. Physical exam, annual  PE conducted    6. Obstructive sleep apnea syndrome  Continue with CPAP.

## 2021-07-15 ENCOUNTER — OFFICE VISIT (OUTPATIENT)
Dept: HEMATOLOGY ONCOLOGY | Facility: MEDICAL CENTER | Age: 61
End: 2021-07-15
Payer: COMMERCIAL

## 2021-07-15 VITALS
DIASTOLIC BLOOD PRESSURE: 64 MMHG | HEIGHT: 71 IN | OXYGEN SATURATION: 94 % | RESPIRATION RATE: 16 BRPM | WEIGHT: 304.57 LBS | TEMPERATURE: 98.6 F | BODY MASS INDEX: 42.64 KG/M2 | SYSTOLIC BLOOD PRESSURE: 122 MMHG | HEART RATE: 54 BPM

## 2021-07-15 DIAGNOSIS — Z87.891 PERSONAL HISTORY OF TOBACCO USE, PRESENTING HAZARDS TO HEALTH: ICD-10-CM

## 2021-07-15 PROCEDURE — 99999 PR NO CHARGE: CPT | Performed by: NURSE PRACTITIONER

## 2021-07-15 ASSESSMENT — FIBROSIS 4 INDEX: FIB4 SCORE: 2.74

## 2021-07-15 NOTE — PROGRESS NOTES
"Subjective:      Leo Laughlin is a 60 y.o. male who presents with Lung Cancer Screening Program Prescreen ([facility]/ [facility]/ Nicotine Dependence/ Peter Christianson)            HPI   Patient seen today for initial lung cancer screening visit. Patient referred by PCP, Peter Christianson, for lung cancer screening shared decision making visit.    The patient meets current eligibility criteria regarding age and smoking history (30+ pack years). He is a former smoker who quit greater than 15 years ago (1998 - 23 years ago) and so DOES NOT meet current eligibility criteria.    - Age - 60  - Smoking history - Patient has smoked for 30 years at an average of 2 ppd = 60 pack year smoking history.  - Current smoking status - former smoker, quit 1998  - No symptoms of lung cancer and no previous history of lung cancer         Review of Systems   Unable to perform ROS: Other   Patient did not meet [facility] criteria - visit not completed       Objective:     /64   Pulse (!) 54   Temp 37 °C (98.6 °F) (Temporal)   Resp 16   Ht 1.805 m (5' 11.06\")   Wt (!) 138 kg (304 lb 9.1 oz)   SpO2 94%   BMI 42.40 kg/m²        Physical Exam  Patient did not meet [facility] criteria - visit not completed            Assessment/Plan:        1. Personal history of tobacco use, presenting hazards to health           Shared decision making visit deferred as patient does not meet current [facility] criteria. Once CMS and Insurance updates inclusion criteria to correlate with recent recommendations per [place] Preventative Task Force, then he may qualify for program and can be re-referred accordingly.  "

## 2021-07-20 ENCOUNTER — HOSPITAL ENCOUNTER (OUTPATIENT)
Dept: LAB | Facility: MEDICAL CENTER | Age: 61
End: 2021-07-20
Attending: PHYSICIAN ASSISTANT
Payer: COMMERCIAL

## 2021-07-20 LAB — HBV CORE AB SERPL QL IA: NONREACTIVE

## 2021-07-20 PROCEDURE — 36415 COLL VENOUS BLD VENIPUNCTURE: CPT

## 2021-07-20 PROCEDURE — 86704 HEP B CORE ANTIBODY TOTAL: CPT

## 2021-07-20 PROCEDURE — 86708 HEPATITIS A ANTIBODY: CPT

## 2021-07-22 LAB — HAV AB SER QL IA: NEGATIVE

## 2021-08-12 ENCOUNTER — HOSPITAL ENCOUNTER (OUTPATIENT)
Dept: RADIOLOGY | Facility: MEDICAL CENTER | Age: 61
End: 2021-08-12
Attending: PHYSICIAN ASSISTANT
Payer: COMMERCIAL

## 2021-08-12 DIAGNOSIS — R79.89 ELEVATED LFTS: ICD-10-CM

## 2021-08-12 PROCEDURE — 76705 ECHO EXAM OF ABDOMEN: CPT

## 2021-10-13 ENCOUNTER — OFFICE VISIT (OUTPATIENT)
Dept: MEDICAL GROUP | Facility: PHYSICIAN GROUP | Age: 61
End: 2021-10-13
Payer: COMMERCIAL

## 2021-10-13 ENCOUNTER — HOSPITAL ENCOUNTER (OUTPATIENT)
Facility: MEDICAL CENTER | Age: 61
End: 2021-10-13
Attending: PHYSICIAN ASSISTANT
Payer: COMMERCIAL

## 2021-10-13 VITALS
WEIGHT: 302.6 LBS | RESPIRATION RATE: 18 BRPM | BODY MASS INDEX: 43.32 KG/M2 | TEMPERATURE: 97.6 F | DIASTOLIC BLOOD PRESSURE: 68 MMHG | SYSTOLIC BLOOD PRESSURE: 118 MMHG | HEART RATE: 72 BPM | HEIGHT: 70 IN | OXYGEN SATURATION: 96 %

## 2021-10-13 DIAGNOSIS — E78.5 DYSLIPIDEMIA: ICD-10-CM

## 2021-10-13 DIAGNOSIS — K76.0 FATTY LIVER: ICD-10-CM

## 2021-10-13 DIAGNOSIS — E11.9 TYPE 2 DIABETES MELLITUS WITHOUT COMPLICATION, WITHOUT LONG-TERM CURRENT USE OF INSULIN (HCC): ICD-10-CM

## 2021-10-13 DIAGNOSIS — Z00.00 PHYSICAL EXAM, ANNUAL: ICD-10-CM

## 2021-10-13 DIAGNOSIS — E66.01 CLASS 3 SEVERE OBESITY DUE TO EXCESS CALORIES WITH SERIOUS COMORBIDITY AND BODY MASS INDEX (BMI) OF 40.0 TO 44.9 IN ADULT (HCC): ICD-10-CM

## 2021-10-13 LAB
HBA1C MFR BLD: 7 % (ref 0–5.6)
INT CON NEG: NEGATIVE
INT CON POS: POSITIVE

## 2021-10-13 PROCEDURE — 83036 HEMOGLOBIN GLYCOSYLATED A1C: CPT | Performed by: PHYSICIAN ASSISTANT

## 2021-10-13 PROCEDURE — 82043 UR ALBUMIN QUANTITATIVE: CPT

## 2021-10-13 PROCEDURE — 99214 OFFICE O/P EST MOD 30 MIN: CPT | Performed by: PHYSICIAN ASSISTANT

## 2021-10-13 PROCEDURE — 82570 ASSAY OF URINE CREATININE: CPT

## 2021-10-13 RX ORDER — ATORVASTATIN CALCIUM 20 MG/1
20 TABLET, FILM COATED ORAL DAILY
Qty: 90 TABLET | Refills: 1 | Status: SHIPPED | OUTPATIENT
Start: 2021-10-13 | End: 2021-12-30 | Stop reason: SDUPTHER

## 2021-10-13 ASSESSMENT — FIBROSIS 4 INDEX: FIB4 SCORE: 2.74

## 2021-10-13 ASSESSMENT — PAIN SCALES - GENERAL: PAINLEVEL: NO PAIN

## 2021-10-13 NOTE — PROGRESS NOTES
CC:  Chief Complaint   Patient presents with   • Diabetes Follow-up       HISTORY OF PRESENT ILLNESS: Patient is a 60 y.o. male established patient presenting for recheck  1. Pt's A1C today is 7%. This has increased from 6.6% from previous visit 4 months ago.   2. Pt's subsequent lab work revealed no Hepatitis in setting of elevated LFTs. His RUQ US positive for fatty liver.   3. Pt's body weight unchanged from previous visit.   4. Pt's lipids significant for low HDL and high LDL. His ASCVD score is 20.9%.    No problem-specific Assessment & Plan notes found for this encounter.    The 10-year ASCVD risk score (Gin MANAS Jr., et al., 2013) is: 20.9%    Patient Active Problem List    Diagnosis Date Noted   • Type 2 diabetes mellitus without complication, without long-term current use of insulin ([facility]) 2021   • Obstructive sleep apnea syndrome 2020   • Chronic low back pain without sciatica 2020   • Hx of colonic polyps 2020   • Arthritis 2020   • Class 3 severe obesity due to excess calories with serious comorbidity and body mass index (BMI) of 40.0 to 44.9 in adult ([facility]) 2020   • Neutropenia ([facility]) 2018      Allergies:Patient has no known allergies.    No current outpatient medications on file.     No current facility-administered medications for this visit.       Social History     Tobacco Use   • Smoking status: Former Smoker     Packs/day: 2.00     Years: 30.00     Pack years: 60.00     Types: Cigarettes     Quit date:      Years since quittin.7   • Smokeless tobacco: Never Used   • Tobacco comment: ages [age]; quit    Vaping Use   • Vaping Use: Never used   Substance Use Topics   • Alcohol use: No   • Drug use: No     Social History     Social History Narrative    ** Merged History Encounter **            Family History   Problem Relation Age of Onset   • Cancer Father    • Diabetes Father    • Diabetes Sister    • Diabetes Brother    • Cancer Brother    •  "Diabetes Sister    • Diabetes Sister    • Diabetes Brother         ROS:     - Constitutional:  Negative for fever, chills, unexpected weight change, and fatigue/generalized weakness.    - HEENT:  Negative for headaches, vision changes, hearing changes, ear pain, ear discharge, rhinorrhea, sinus congestion, sore throat, and neck pain.      - Respiratory: Negative for cough, sputum production, chest congestion, dyspnea, wheezing, and crackles.      - Cardiovascular: Negative for chest pain, palpitations, orthopnea, and bilateral lower extremity edema.     - Gastrointestinal: Negative for heartburn, nausea, vomiting, abdominal pain, hematochezia, melena, diarrhea, constipation, and greasy/foul-smelling stools.     - Genitourinary: Negative for dysuria, polyuria, hematuria, pyuria, urinary urgency, and urinary incontinence.     - Musculoskeletal: Negative for myalgias, back pain, and joint pain.     - Skin: Negative for rash, itching, cyanotic skin color change.     - Neurological: Negative for dizziness, tingling, tremors, focal sensory deficit, focal weakness and headaches.     - Endo/Heme/Allergies: Does not bruise/bleed easily.     - Psychiatric/Behavioral: Negative for depression, suicidal/homicidal ideation and memory loss.          - NOTE: All other systems reviewed and are negative, except as in HPI.      Exam:    /68   Pulse 72   Temp 36.4 °C (97.6 °F) (Temporal)   Resp 18   Ht 1.778 m (5' 10\")   Wt (!) 137 kg (302 lb 9.6 oz)   SpO2 96%  Body mass index is 43.42 kg/m².    General:  Well nourished, well developed male in NAD  Head is grossly normal.  Neck: Supple. Thyroid is not enlarged.  Skin: Warm and dry. No obvious lesions  Neuro: Normal muscle tone. Gait normal. Alert and oriented. Monofilament exam normal.   Psych: Normal mood and affect      Please note that this dictation was created using voice recognition software. I have made every reasonable attempt to correct obvious errors, but I " expect that there are errors of grammar and possibly content that I did not discover before finalizing the note.    LABS: 10/13/2021: Results reviewed and discussed with the patient, questions answered.    Assessment/Plan:  1. Type 2 diabetes mellitus without complication, without long-term current use of insulin ([facility])  A1C has increased since last visit. Advised that he is on borderline of needing medication for this. Will check again in 5 months. He will continue to work on lifestyle changes.   - POCT Hemoglobin A1C  - Diabetic Monofilament Lower Extremity Exam  - Microalbumin Creat Ratio Urine - Clinic Collect; Future    2. Dyslipidemia  Will start on lipitor. Recheck in 5 months. Advised regarding side effects.   - atorvastatin (LIPITOR) 20 MG Tab; Take 1 Tablet by mouth every day for 180 days.  Dispense: 90 Tablet; Refill: 1    3. Physical exam, annual  Labs printed.   - CBC WITH DIFFERENTIAL; Future  - Comp Metabolic Panel; Future  - Lipid Profile; Future    4. Fatty liver  Will continue to work on weight loss    5. Class 3 severe obesity due to excess calories with serious comorbidity and body mass index (BMI) of 40.0 to 44.9 in adult ([facility])  He will continue to work on this.

## 2021-10-14 LAB
CREAT UR-MCNC: 152.48 MG/DL
MICROALBUMIN UR-MCNC: <1.2 MG/DL
MICROALBUMIN/CREAT UR: NORMAL MG/G (ref 0–30)

## 2021-12-30 DIAGNOSIS — E78.5 DYSLIPIDEMIA: ICD-10-CM

## 2022-01-03 RX ORDER — ATORVASTATIN CALCIUM 20 MG/1
20 TABLET, FILM COATED ORAL DAILY
Qty: 90 TABLET | Refills: 1 | Status: SHIPPED | OUTPATIENT
Start: 2022-01-03 | End: 2022-07-02

## 2022-01-19 ENCOUNTER — OFFICE VISIT (OUTPATIENT)
Dept: URGENT CARE | Facility: CLINIC | Age: 62
End: 2022-01-19
Payer: COMMERCIAL

## 2022-01-19 ENCOUNTER — HOSPITAL ENCOUNTER (OUTPATIENT)
Facility: MEDICAL CENTER | Age: 62
End: 2022-01-19
Attending: STUDENT IN AN ORGANIZED HEALTH CARE EDUCATION/TRAINING PROGRAM
Payer: COMMERCIAL

## 2022-01-19 VITALS
TEMPERATURE: 98.1 F | SYSTOLIC BLOOD PRESSURE: 112 MMHG | WEIGHT: 295.3 LBS | DIASTOLIC BLOOD PRESSURE: 56 MMHG | RESPIRATION RATE: 14 BRPM | HEIGHT: 70 IN | HEART RATE: 69 BPM | OXYGEN SATURATION: 96 % | BODY MASS INDEX: 42.28 KG/M2

## 2022-01-19 DIAGNOSIS — Z20.822 COVID-19 RULED OUT: ICD-10-CM

## 2022-01-19 DIAGNOSIS — J06.9 VIRAL URI WITH COUGH: ICD-10-CM

## 2022-01-19 PROCEDURE — U0005 INFEC AGEN DETEC AMPLI PROBE: HCPCS

## 2022-01-19 PROCEDURE — U0003 INFECTIOUS AGENT DETECTION BY NUCLEIC ACID (DNA OR RNA); SEVERE ACUTE RESPIRATORY SYNDROME CORONAVIRUS 2 (SARS-COV-2) (CORONAVIRUS DISEASE [COVID-19]), AMPLIFIED PROBE TECHNIQUE, MAKING USE OF HIGH THROUGHPUT TECHNOLOGIES AS DESCRIBED BY CMS-2020-01-R: HCPCS

## 2022-01-19 PROCEDURE — 99213 OFFICE O/P EST LOW 20 MIN: CPT | Mod: CS | Performed by: STUDENT IN AN ORGANIZED HEALTH CARE EDUCATION/TRAINING PROGRAM

## 2022-01-19 ASSESSMENT — FIBROSIS 4 INDEX: FIB4 SCORE: 2.78

## 2022-01-19 NOTE — PROGRESS NOTES
Subjective:   CHIEF COMPLAINT  Chief Complaint   Patient presents with   • Cough     started    • Runny Nose     started        HPI  Leo Laughlin is a 61 y.o. male who presents requesting be tested for COVID.  Patient is vaccinated x3.  4 days ago the patient developed sneezing, runny nose and cough.  Overall symptoms have improved.  He has not tried taking any medications.  No wheezing or shortness of breath.  No sick contacts.    REVIEW OF SYSTEMS  General: no fever or chills  GI: no nausea or vomiting  See [facility] for further details.     PAST MEDICAL HISTORY  Patient Active Problem List    Diagnosis Date Noted   • Dyslipidemia 10/13/2021   • Fatty liver 10/13/2021   • Type 2 diabetes mellitus without complication, without long-term current use of insulin ([facility]) 2021   • Obstructive sleep apnea syndrome 2020   • Chronic low back pain without sciatica 2020   • Hx of colonic polyps 2020   • Arthritis 2020   • Class 3 severe obesity due to excess calories with serious comorbidity and body mass index (BMI) of 40.0 to 44.9 in adult ([facility]) 2020   • Neutropenia ([facility]) 2018       SURGICAL HISTORY   has a past surgical history that includes arthroscopy, knee; robotic laparoscopic cholecystectomy (2019); cholecystectomy; and carpal tunnel release (Bilateral).    ALLERGIES  No Known Allergies    CURRENT MEDICATIONS  Home Medications     Reviewed by Bekah Correia, Student (Medical Assistant) on 22 at 1149  Med List Status: <None>   Medication Last Dose Status   atorvastatin (LIPITOR) 20 MG Tab Taking Active                SOCIAL HISTORY  Social History     Tobacco Use   • Smoking status: Former Smoker     Packs/day: 2.00     Years: 30.00     Pack years: 60.00     Types: Cigarettes     Quit date:      Years since quittin.0   • Smokeless tobacco: Never Used   • Tobacco comment: ages [age]; quit    Vaping Use   • Vaping Use: Never used   Substance and  "Sexual Activity   • Alcohol use: No   • Drug use: No   • Sexual activity: Not on file       FAMILY HISTORY  Family History   Problem Relation Age of Onset   • Cancer Father    • Diabetes Father    • Diabetes Sister    • Diabetes Brother    • Cancer Brother    • Diabetes Sister    • Diabetes Sister    • Diabetes Brother           Objective:   PHYSICAL EXAM  VITAL SIGNS: /56 (BP Location: Right arm, Patient Position: Sitting, BP Cuff Size: Large adult)   Pulse 69   Temp 36.7 °C (98.1 °F) (Temporal)   Resp 14   Ht 1.778 m (5' 10\")   Wt (!) 134 kg (295 lb 4.8 oz)   SpO2 96%   BMI 42.37 kg/m²     Gen: no acute distress, normal voice  Skin: dry, intact, moist mucosal membranes  Lungs: CTAB w/ symmetric expansion  CV: RRR w/o murmurs or clicks  Psych: normal affect, normal judgement, alert, awake    Assessment/Plan:     1. Viral URI with cough     2. COVID-19 ruled out  COVID/SARS CoV-2 PCR    Signs and symptoms are consistent with a viral upper respiratory tract infection.  Patient is vaccinated x3  -Ordered Covid.  Results will be sent through [employer].  -Instructed to quarantine until Covid results have been returned  -Encouraged hydration and symptomatic treatment with Tylenol/ibuprofen prn  -Discussed red flags and return precautions.  Patient verbalized their understanding.  All questions were answered.    Differential diagnosis, natural history, supportive care, and indications for immediate follow-up discussed. Patient agrees with the plan of care.    Follow-up as needed if symptoms worsen or fail to improve to PCP, Urgent care or Emergency Room.       Please note that this dictation was created using voice recognition software. I have made a reasonable attempt to correct obvious errors, but I expect that there are errors of grammar and possibly content that I did not discover before finalizing the note.  "

## 2022-01-20 DIAGNOSIS — Z20.822 COVID-19 RULED OUT: ICD-10-CM

## 2022-01-20 LAB
COVID ORDER STATUS COVID19: NORMAL
SARS-COV-2 RNA RESP QL NAA+PROBE: NOTDETECTED
SPECIMEN SOURCE: NORMAL

## 2022-03-14 ENCOUNTER — OFFICE VISIT (OUTPATIENT)
Dept: MEDICAL GROUP | Facility: PHYSICIAN GROUP | Age: 62
End: 2022-03-14
Payer: COMMERCIAL

## 2022-03-14 VITALS
WEIGHT: 290.8 LBS | TEMPERATURE: 96.6 F | RESPIRATION RATE: 18 BRPM | HEART RATE: 64 BPM | HEIGHT: 70 IN | BODY MASS INDEX: 41.63 KG/M2 | SYSTOLIC BLOOD PRESSURE: 122 MMHG | DIASTOLIC BLOOD PRESSURE: 78 MMHG | OXYGEN SATURATION: 94 %

## 2022-03-14 DIAGNOSIS — E11.9 TYPE 2 DIABETES MELLITUS WITHOUT COMPLICATION, WITHOUT LONG-TERM CURRENT USE OF INSULIN (HCC): ICD-10-CM

## 2022-03-14 DIAGNOSIS — E78.5 DYSLIPIDEMIA: ICD-10-CM

## 2022-03-14 DIAGNOSIS — E66.01 CLASS 3 SEVERE OBESITY DUE TO EXCESS CALORIES WITH SERIOUS COMORBIDITY AND BODY MASS INDEX (BMI) OF 40.0 TO 44.9 IN ADULT (HCC): ICD-10-CM

## 2022-03-14 LAB
HBA1C MFR BLD: 6.2 % (ref 0–5.6)
INT CON NEG: NEGATIVE
INT CON POS: POSITIVE

## 2022-03-14 PROCEDURE — 99214 OFFICE O/P EST MOD 30 MIN: CPT | Performed by: PHYSICIAN ASSISTANT

## 2022-03-14 PROCEDURE — 83036 HEMOGLOBIN GLYCOSYLATED A1C: CPT | Performed by: PHYSICIAN ASSISTANT

## 2022-03-14 ASSESSMENT — FIBROSIS 4 INDEX: FIB4 SCORE: 2.78

## 2022-03-14 ASSESSMENT — PATIENT HEALTH QUESTIONNAIRE - PHQ9: CLINICAL INTERPRETATION OF PHQ2 SCORE: 0

## 2022-03-14 NOTE — PROGRESS NOTES
CC:  Chief Complaint   Patient presents with   • Follow-Up     6mons   • Lab Results       HISTORY OF PRESENT ILLNESS: Patient is a 61 y.o. male established patient presenting for recheck  1. Pt's A1C has improved significantly since his last visit and is at 6.2%. Not currently on any DM medications. He notes that he has been working to improve his diet and lose weight.   2. Pt's lipids have improved since losing weight and starting on lipitor.   3. Pt's LFTs have reduced significantly since last checked as well.     No problem-specific Assessment & Plan notes found for this encounter.      Patient Active Problem List    Diagnosis Date Noted   • Dyslipidemia 10/13/2021   • Fatty liver 10/13/2021   • Type 2 diabetes mellitus without complication, without long-term current use of insulin ([facility]) 2021   • Obstructive sleep apnea syndrome 2020   • Chronic low back pain without sciatica 2020   • Hx of colonic polyps 2020   • Arthritis 2020   • Class 3 severe obesity due to excess calories with serious comorbidity and body mass index (BMI) of 40.0 to 44.9 in adult ([facility]) 2020   • Neutropenia ([facility]) 2018      Allergies:Patient has no known allergies.    Current Outpatient Medications   Medication Sig Dispense Refill   • atorvastatin (LIPITOR) 20 MG Tab Take 1 Tablet by mouth every day for 180 days. 90 Tablet 1     No current facility-administered medications for this visit.       Social History     Tobacco Use   • Smoking status: Former Smoker     Packs/day: 2.00     Years: 30.00     Pack years: 60.00     Types: Cigarettes     Quit date:      Years since quittin.2   • Smokeless tobacco: Never Used   • Tobacco comment: ages [age]; quit    Vaping Use   • Vaping Use: Never used   Substance Use Topics   • Alcohol use: No   • Drug use: No     Social History     Social History Narrative    ** Merged History Encounter **            Family History   Problem Relation Age of Onset  "  • Cancer Father    • Diabetes Father    • Diabetes Sister    • Diabetes Brother    • Cancer Brother    • Diabetes Sister    • Diabetes Sister    • Diabetes Brother         ROS:     - Constitutional:  Negative for fever, chills, unexpected weight change, and fatigue/generalized weakness.       Exam:    /78   Pulse 64   Temp 35.9 °C (96.6 °F) (Temporal)   Resp 18   Ht 1.778 m (5' 10\")   Wt (!) 132 kg (290 lb 12.8 oz)   SpO2 94%  Body mass index is 41.73 kg/m².    General:  Well nourished, well developed male in NAD  Head is grossly normal.  Neck: Supple. Thyroid is not enlarged.  Skin: Warm and dry. No obvious lesions  Neuro: Normal muscle tone. Gait normal. Alert and oriented.  Psych: Normal mood and affect      Please note that this dictation was created using voice recognition software. I have made every reasonable attempt to correct obvious errors, but I expect that there are errors of grammar and possibly content that I did not discover before finalizing the note.    LABS: 3/14/22: Results reviewed and discussed with the patient, questions answered.    Assessment/Plan:  1. Type 2 diabetes mellitus without complication, without long-term current use of insulin ([facility])  Much improved. Will check this again in 6 months.   - POCT Hemoglobin A1C    2. Dyslipidemia  Much improved. Continue with lipitor. Can consider discontinuation of this in future.     3. Class 3 severe obesity due to excess calories with serious comorbidity and body mass index (BMI) of 40.0 to 44.9 in adult ([facility])  Improving.            "

## 2022-09-14 ENCOUNTER — OFFICE VISIT (OUTPATIENT)
Dept: MEDICAL GROUP | Facility: PHYSICIAN GROUP | Age: 62
End: 2022-09-14
Payer: COMMERCIAL

## 2022-09-14 VITALS
HEIGHT: 70 IN | OXYGEN SATURATION: 95 % | DIASTOLIC BLOOD PRESSURE: 78 MMHG | TEMPERATURE: 97 F | RESPIRATION RATE: 16 BRPM | WEIGHT: 278.6 LBS | HEART RATE: 40 BPM | SYSTOLIC BLOOD PRESSURE: 102 MMHG | BODY MASS INDEX: 39.88 KG/M2

## 2022-09-14 DIAGNOSIS — Z00.00 PHYSICAL EXAM, ANNUAL: ICD-10-CM

## 2022-09-14 DIAGNOSIS — E78.5 DYSLIPIDEMIA: ICD-10-CM

## 2022-09-14 DIAGNOSIS — Z12.11 SCREENING FOR COLON CANCER: ICD-10-CM

## 2022-09-14 DIAGNOSIS — E11.9 TYPE 2 DIABETES MELLITUS WITHOUT COMPLICATION, WITHOUT LONG-TERM CURRENT USE OF INSULIN (HCC): ICD-10-CM

## 2022-09-14 LAB
HBA1C MFR BLD: 5.6 % (ref 0–5.6)
INT CON NEG: NEGATIVE
INT CON POS: POSITIVE

## 2022-09-14 PROCEDURE — 99214 OFFICE O/P EST MOD 30 MIN: CPT | Performed by: PHYSICIAN ASSISTANT

## 2022-09-14 PROCEDURE — 83036 HEMOGLOBIN GLYCOSYLATED A1C: CPT | Performed by: PHYSICIAN ASSISTANT

## 2022-09-14 RX ORDER — ATORVASTATIN CALCIUM 20 MG/1
TABLET, FILM COATED ORAL
COMMUNITY
End: 2022-09-14 | Stop reason: SDUPTHER

## 2022-09-14 RX ORDER — ATORVASTATIN CALCIUM 20 MG/1
20 TABLET, FILM COATED ORAL DAILY
Qty: 90 TABLET | Refills: 3 | Status: SHIPPED | OUTPATIENT
Start: 2022-09-14 | End: 2023-09-11

## 2022-09-14 ASSESSMENT — FIBROSIS 4 INDEX: FIB4 SCORE: 2.78

## 2022-09-14 NOTE — PROGRESS NOTES
CC:  Chief Complaint   Patient presents with    Diabetes Follow-up    Referral Needed     Colonoscopy        HISTORY OF PRESENT ILLNESS: Patient is a 61 y.o. male established patient presenting for recheck  Pt's A1C today at 5.6%. he has lost 12 lbs since his last visit.   Needing colonoscopy referral.     No problem-specific Assessment & Plan notes found for this encounter.      Patient Active Problem List    Diagnosis Date Noted    Dyslipidemia 10/13/2021    Fatty liver 10/13/2021    Type 2 diabetes mellitus without complication, without long-term current use of insulin ([facility]) 2021    Obstructive sleep apnea syndrome 2020    Chronic low back pain without sciatica 2020    Hx of colonic polyps 2020    Arthritis 2020    Class 3 severe obesity due to excess calories with serious comorbidity and body mass index (BMI) of 40.0 to 44.9 in adult ([facility]) 2020    Neutropenia ([facility]) 2018      Allergies:Patient has no known allergies.    Current Outpatient Medications   Medication Sig Dispense Refill    atorvastatin (LIPITOR) 20 MG Tab        No current facility-administered medications for this visit.       Social History     Tobacco Use    Smoking status: Former     Packs/day: 2.00     Years: 30.00     Pack years: 60.00     Types: Cigarettes     Quit date:      Years since quittin.7    Smokeless tobacco: Never    Tobacco comments:     ages [age]; quit    Vaping Use    Vaping Use: Never used   Substance Use Topics    Alcohol use: No    Drug use: No     Social History     Social History Narrative    ** Merged History Encounter **            Family History   Problem Relation Age of Onset    Cancer Father     Diabetes Father     Diabetes Sister     Diabetes Brother     Cancer Brother     Diabetes Sister     Diabetes Sister     Diabetes Brother         ROS:     - Constitutional:  Negative for fever, chills, unexpected weight change, and fatigue/generalized weakness.    - HEENT:   "Negative for headaches, vision changes, hearing changes, ear pain, ear discharge, rhinorrhea, sinus congestion, sore throat, and neck pain.      - Respiratory: Negative for cough, sputum production, chest congestion, dyspnea, wheezing, and crackles.      - Cardiovascular: Negative for chest pain, palpitations, orthopnea, and bilateral lower extremity edema.         Exam:    /78   Pulse (!) 40   Temp 36.1 °C (97 °F) (Temporal)   Resp 16   Ht 1.778 m (5' 10\")   Wt (!) 126 kg (278 lb 9.6 oz)   SpO2 95%  Body mass index is 39.97 kg/m².    General:  Well nourished, well developed male in NAD  Head is grossly normal.  Neck: Supple. Thyroid is not enlarged.  Pulmonary: Clear to auscultation. No ronchi, wheezing or rales  Cardiac: Regular rate and rhythm. No murmurs.  Skin: Warm and dry. No obvious lesions  Neuro: Normal muscle tone. Gait normal. Alert and oriented.  Psych: Normal mood and affect      Please note that this dictation was created using voice recognition software. I have made every reasonable attempt to correct obvious errors, but I expect that there are errors of grammar and possibly content that I did not discover before finalizing the note.    LABS: 9/14/222: Results reviewed and discussed with the patient, questions answered.    Assessment/Plan:  1. Type 2 diabetes mellitus without complication, without long-term current use of insulin ([facility])  Doing very well with diet and exercise control.   - POCT Hemoglobin A1C  - MICROALBUMIN CREAT RATIO URINE; Future    2. Screening for colon cancer    - Referral to GI for Colonoscopy    3. Physical exam, annual  Labs printed.   - CBC WITH DIFFERENTIAL; Future  - Comp Metabolic Panel; Future  - HEMOGLOBIN A1C; Future  - Lipid Profile; Future  - TSH WITH REFLEX TO FT4; Future    4. Dyslipidemia  Continue with lipitor.   - atorvastatin (LIPITOR) 20 MG Tab; Take 1 Tablet by mouth every day.  Dispense: 90 Tablet; Refill: 3             "

## 2023-03-05 ENCOUNTER — OFFICE VISIT (OUTPATIENT)
Dept: URGENT CARE | Facility: CLINIC | Age: 63
End: 2023-03-05
Payer: COMMERCIAL

## 2023-03-05 VITALS
RESPIRATION RATE: 18 BRPM | BODY MASS INDEX: 41.52 KG/M2 | WEIGHT: 290 LBS | TEMPERATURE: 98.3 F | DIASTOLIC BLOOD PRESSURE: 86 MMHG | SYSTOLIC BLOOD PRESSURE: 136 MMHG | HEIGHT: 70 IN | OXYGEN SATURATION: 98 % | HEART RATE: 102 BPM

## 2023-03-05 DIAGNOSIS — U07.1 COVID-19: ICD-10-CM

## 2023-03-05 PROCEDURE — 99213 OFFICE O/P EST LOW 20 MIN: CPT | Performed by: PHYSICIAN ASSISTANT

## 2023-03-05 ASSESSMENT — FIBROSIS 4 INDEX: FIB4 SCORE: 2.83

## 2023-03-05 ASSESSMENT — ENCOUNTER SYMPTOMS
SORE THROAT: 0
DIARRHEA: 0
VOMITING: 0
WHEEZING: 0
HEADACHES: 0
FEVER: 0
MYALGIAS: 0
CHILLS: 0
PALPITATIONS: 0
SHORTNESS OF BREATH: 0
COUGH: 1
DIZZINESS: 0
SPUTUM PRODUCTION: 0
NAUSEA: 0
ABDOMINAL PAIN: 0

## 2023-03-05 NOTE — PROGRESS NOTES
Subjective:     CHIEF COMPLAINT  Chief Complaint   Patient presents with    Coronavirus Screening     Tested pos. on a home test. 3/5/2023 cough, no fever       HPI  Leo Laughlin is a very pleasant 62 y.o. male who presents to the clinic after testing positive for COVID-19 via home testing this morning.  Symptoms began this morning.  Currently symptoms are very mild with mild sinus congestion and an occasional dry cough.  He has not been running a fever.  No body aches or chills.  No shortness of breath or chest pain.  Wife tested positive for COVID 3 days ago.  Vaccinated for COVID-19 x5.    REVIEW OF SYSTEMS  Review of Systems   Constitutional:  Negative for chills, fever and malaise/fatigue.   HENT:  Positive for congestion. Negative for sore throat.    Respiratory:  Positive for cough. Negative for sputum production, shortness of breath and wheezing.    Cardiovascular:  Negative for chest pain and palpitations.   Gastrointestinal:  Negative for abdominal pain, diarrhea, nausea and vomiting.   Musculoskeletal:  Negative for myalgias.   Skin:  Negative for rash.   Neurological:  Negative for dizziness and headaches.     PAST MEDICAL HISTORY  Patient Active Problem List    Diagnosis Date Noted    Dyslipidemia 10/13/2021    Fatty liver 10/13/2021    Type 2 diabetes mellitus without complication, without long-term current use of insulin ([facility]) 07/13/2021    Obstructive sleep apnea syndrome 06/02/2020    Chronic low back pain without sciatica 06/02/2020    Hx of colonic polyps 06/02/2020    Arthritis 06/02/2020    Class 3 severe obesity due to excess calories with serious comorbidity and body mass index (BMI) of 40.0 to 44.9 in adult ([facility]) 06/02/2020    Neutropenia ([facility]) 11/22/2018       SURGICAL HISTORY   has a past surgical history that includes arthroscopy, knee; robotic laparoscopic cholecystectomy (01/22/2019); cholecystectomy; and carpal tunnel release (Bilateral).    ALLERGIES  No Known  "Allergies    CURRENT MEDICATIONS  Home Medications       Reviewed by Xiang Fountain P.A.-C. (Physician Assistant) on 23 at 1331  Med List Status: <None>     Medication Last Dose Status   atorvastatin (LIPITOR) 20 MG Tab Taking Active                    SOCIAL HISTORY  Social History     Tobacco Use    Smoking status: Former     Packs/day: 2.00     Years: 30.00     Pack years: 60.00     Types: Cigarettes     Quit date:      Years since quittin.1    Smokeless tobacco: Never    Tobacco comments:     ages [age]; quit    Vaping Use    Vaping Use: Never used   Substance and Sexual Activity    Alcohol use: No    Drug use: No    Sexual activity: Not on file       FAMILY HISTORY  Family History   Problem Relation Age of Onset    Cancer Father     Diabetes Father     Diabetes Sister     Diabetes Brother     Cancer Brother     Diabetes Sister     Diabetes Sister     Diabetes Brother           Objective:     VITAL SIGNS: /86 (BP Location: Left arm, Patient Position: Sitting, BP Cuff Size: Adult)   Pulse (!) 102   Temp 36.8 °C (98.3 °F) (Temporal)   Resp 18   Ht 1.778 m (5' 10\")   Wt (!) 132 kg (290 lb)   SpO2 98%   BMI 41.61 kg/m²     PHYSICAL EXAM  Physical Exam  Constitutional:       General: He is not in acute distress.     Appearance: Normal appearance. He is not ill-appearing, toxic-appearing or diaphoretic.   HENT:      Head: Normocephalic and atraumatic.      Right Ear: Tympanic membrane, ear canal and external ear normal.      Left Ear: Tympanic membrane, ear canal and external ear normal.      Nose: Nose normal. No congestion or rhinorrhea.      Mouth/Throat:      Mouth: Mucous membranes are moist.      Pharynx: No oropharyngeal exudate or posterior oropharyngeal erythema.   Eyes:      Conjunctiva/sclera: Conjunctivae normal.   Cardiovascular:      Rate and Rhythm: Regular rhythm. Tachycardia present.      Pulses: Normal pulses.      Heart sounds: Normal heart sounds.   Pulmonary:      " Effort: Pulmonary effort is normal.      Breath sounds: Normal breath sounds. No wheezing, rhonchi or rales.   Musculoskeletal:      Cervical back: Normal range of motion. No muscular tenderness.   Lymphadenopathy:      Cervical: No cervical adenopathy.   Skin:     General: Skin is warm and dry.      Capillary Refill: Capillary refill takes less than 2 seconds.   Neurological:      Mental Status: He is alert.   Psychiatric:         Mood and Affect: Mood normal.         Thought Content: Thought content normal.       Assessment/Plan:     1. COVID-19  - Nirmatrelvir&Ritonavir 300/100 20 x 150 MG & 10 x 100MG Tablet Therapy Pack; Take 300 mg nirmatrelvir (two 150 mg tablets) with 100 mg ritonavir (one 100 mg tablet) by mouth, with all three tablets taken together twice daily for 5 days.  Dispense: 30 Each; Refill: 0      MDM/Comments:    Patient tested positive for COVID-19 via home testing this morning.  In clinic patient appears well.  Vital signs stable and reassuring.  Lung sounds clear to auscultation.  No wheezes rhonchi or rales.  SPO2 98% on room air.  Vaccinated for COVID-19 x5.  Patient would like to begin Paxlovid.  Discussed risk versus benefits.  Wife is currently taking this medication and her symptoms have been improving.  Discussed discontinuing atorvastatin while on this medication.  May begin atorvastatin 3 days after completion.  Return/ED precautions discussed.    Differential diagnosis, natural history, supportive care, and indications for immediate follow-up discussed. All questions answered. Patient agrees with the plan of care.    Follow-up as needed if symptoms worsen or fail to improve to PCP, Urgent care or Emergency Room.    I have personally reviewed prior external notes and test results pertinent to today's visit.  I have independently reviewed and interpreted all diagnostics ordered during this urgent care acute visit.   Discussed management options (risks,benefits, and alternatives to  treatment). Pt expresses understanding and the treatment plan was agreed upon. Questions were encouraged and answered to pt's satisfaction.    Please note that this dictation was created using voice recognition software. I have made a reasonable attempt to correct obvious errors, but I expect that there are errors of grammar and possibly content that I did not discover before finalizing the note.

## 2023-03-14 ENCOUNTER — APPOINTMENT (OUTPATIENT)
Dept: MEDICAL GROUP | Facility: PHYSICIAN GROUP | Age: 63
End: 2023-03-14

## 2023-03-20 LAB — HBA1C MFR BLD: 6.6 % (ref ?–5.8)

## 2023-03-22 ENCOUNTER — PATIENT MESSAGE (OUTPATIENT)
Dept: MEDICAL GROUP | Facility: PHYSICIAN GROUP | Age: 63
End: 2023-03-22

## 2023-03-22 ENCOUNTER — OFFICE VISIT (OUTPATIENT)
Dept: MEDICAL GROUP | Facility: PHYSICIAN GROUP | Age: 63
End: 2023-03-22
Payer: COMMERCIAL

## 2023-03-22 VITALS
BODY MASS INDEX: 39.94 KG/M2 | OXYGEN SATURATION: 93 % | RESPIRATION RATE: 14 BRPM | TEMPERATURE: 97.6 F | WEIGHT: 279 LBS | HEIGHT: 70 IN | DIASTOLIC BLOOD PRESSURE: 84 MMHG | SYSTOLIC BLOOD PRESSURE: 114 MMHG | HEART RATE: 72 BPM

## 2023-03-22 DIAGNOSIS — Z00.00 PHYSICAL EXAM, ANNUAL: ICD-10-CM

## 2023-03-22 DIAGNOSIS — E11.9 TYPE 2 DIABETES MELLITUS WITHOUT COMPLICATION, WITHOUT LONG-TERM CURRENT USE OF INSULIN (HCC): ICD-10-CM

## 2023-03-22 DIAGNOSIS — Z23 NEED FOR VACCINATION: ICD-10-CM

## 2023-03-22 PROCEDURE — 90677 PCV20 VACCINE IM: CPT | Performed by: PHYSICIAN ASSISTANT

## 2023-03-22 PROCEDURE — 90471 IMMUNIZATION ADMIN: CPT | Performed by: PHYSICIAN ASSISTANT

## 2023-03-22 PROCEDURE — 99396 PREV VISIT EST AGE 40-64: CPT | Mod: 25 | Performed by: PHYSICIAN ASSISTANT

## 2023-03-22 ASSESSMENT — PATIENT HEALTH QUESTIONNAIRE - PHQ9: CLINICAL INTERPRETATION OF PHQ2 SCORE: 0

## 2023-03-22 ASSESSMENT — FIBROSIS 4 INDEX: FIB4 SCORE: 2.83

## 2023-03-22 NOTE — PROGRESS NOTES
CC:    Chief Complaint   Patient presents with    Annual Exam     PE        HISTORY OF THE PRESENT ILLNESS:  62 y.o. male presenting for annual physical exam.   Pt's A1C at 6.6%. Notes that he has been eating a lot of sweets recently.   Lipids well controlled with lipitor      No problem-specific Assessment & Plan notes found for this encounter.    Allergies: Patient has no known allergies.    Current Outpatient Medications Ordered in Epic   Medication Sig Dispense Refill    atorvastatin (LIPITOR) 20 MG Tab Take 1 Tablet by mouth every day. 90 Tablet 3    Nirmatrelvir&Ritonavir 300/100 20 x 150 MG & 10 x 100MG Tablet Therapy Pack Take 300 mg nirmatrelvir (two 150 mg tablets) with 100 mg ritonavir (one 100 mg tablet) by mouth, with all three tablets taken together twice daily for 5 days. 30 Each 0     No current [facility]-ordered facility-administered medications on file.       Past Medical History:   Diagnosis Date    Arthritis     Neutropenia (HCC) 2018       Past Surgical History:   Procedure Laterality Date    ROBOTIC LAPAROSCOPIC CHOLECYSTECTOMY  2019    ARTHROSCOPY, KNEE      left    CARPAL TUNNEL RELEASE Bilateral     CHOLECYSTECTOMY         Social History     Tobacco Use    Smoking status: Former     Packs/day: 2.00     Years: 30.00     Pack years: 60.00     Types: Cigarettes     Quit date:      Years since quittin.2    Smokeless tobacco: Never    Tobacco comments:     ages [age]; quit    Vaping Use    Vaping Use: Never used   Substance Use Topics    Alcohol use: No    Drug use: No       Social History     Social History Narrative    ** Merged History Encounter **            Family History   Problem Relation Age of Onset    Cancer Father     Diabetes Father     Diabetes Sister     Diabetes Brother     Cancer Brother     Diabetes Sister     Diabetes Sister     Diabetes Brother        ROS:     - Constitutional: Negative for fever, chills, unexpected weight change, and fatigue/generalized  "weakness.     - HEENT: Negative for headaches, vision changes, hearing changes, ear pain, ear discharge, rhinorrhea, sinus congestion, sore throat, and neck pain.      - Respiratory: Negative for cough, sputum production, chest congestion, dyspnea, wheezing, and crackles.      - Cardiovascular: Negative for chest pain, palpitations, orthopnea, and bilateral lower extremity edema.     - Gastrointestinal: Negative for heartburn, nausea, vomiting, abdominal pain, hematochezia, melena, diarrhea, constipation, and greasy/foul-smelling stools.     - Genitourinary: Negative for dysuria, polyuria, hematuria, pyuria, urinary urgency, and urinary incontinence.     - Musculoskeletal: Negative for myalgias, back pain, and joint pain.     - Skin: Negative for rash, itching, cyanotic skin color change.     - Neurological: Negative for dizziness, tingling, tremors, focal sensory deficit, focal weakness and headaches.     - Endo/Heme/Allergies: Does not bruise/bleed easily.     - Psychiatric/Behavioral: Negative for depression, suicidal/homicidal ideation and memory loss.          Health Maintenance  Cholesterol Screening: Fasting lipid panel  Diabetes Screening: Fasting blood sugar  Exercise: Regular exercise.   Substance Abuse: None  Safe in relationship Yes     Cancer screening  Colorectal Cancer Screening: January 2023, due again in 2026         Exam: /84   Pulse 72   Temp 36.4 °C (97.6 °F) (Temporal)   Resp 14   Ht 1.778 m (5' 10\")   Wt (!) 127 kg (279 lb)   SpO2 93%  Body mass index is 40.03 kg/m².    General: Normal appearing. No distress.  HEENT: Normocephalic. Eyes conjunctiva clear lids without ptosis, pupils equal and reactive to light accommodation, ears normal shape and contour, canals are clear bilaterally, tympanic membranes are benign, nasal mucosa benign, oropharynx is without erythema, edema or exudates.   Neck: Supple without JVD or bruit. No thyromegaly. No cervical lymphadenopathy  Pulmonary: Clear " to ausculation.  Normal effort. No rales, ronchi, or wheezing.  Cardiovascular: Regular rate and rhythm without murmur, gallops or rubs  Neurologic: Grossly nonfocal, gait normal, normal muscle tone  Skin: Warm and dry.  No obvious lesions.  Musculoskeletal: No extremity cyanosis, clubbing, or edema. No deformity  Psych: Normal mood and affect. Alert and oriented x3. Judgment and insight is normal.    Please note that this dictation was created using voice recognition software. I have made every reasonable attempt to correct obvious errors, but I expect that there are errors of grammar and possibly content that I did not discover before finalizing the note.      Assessment/Plan  1. Need for vaccination    - Pneumococcal Conjugate Vaccine 20-Valent (19 yrs+)    2. Type 2 diabetes mellitus without complication, without long-term current use of insulin (HCC)  Increased but still at goal.   - Diabetic Monofilament LE Exam  - POCT Retinal Eye Exam    3. Physical exam, annual  PE conducted

## 2023-09-10 DIAGNOSIS — E78.5 DYSLIPIDEMIA: ICD-10-CM

## 2023-09-11 RX ORDER — ATORVASTATIN CALCIUM 20 MG/1
20 TABLET, FILM COATED ORAL DAILY
Qty: 90 TABLET | Refills: 3 | Status: SHIPPED | OUTPATIENT
Start: 2023-09-11 | End: 2024-02-09 | Stop reason: SDUPTHER

## 2023-10-24 ENCOUNTER — OFFICE VISIT (OUTPATIENT)
Dept: MEDICAL GROUP | Facility: PHYSICIAN GROUP | Age: 63
End: 2023-10-24
Payer: MEDICARE

## 2023-10-24 VITALS
SYSTOLIC BLOOD PRESSURE: 118 MMHG | BODY MASS INDEX: 41.5 KG/M2 | DIASTOLIC BLOOD PRESSURE: 80 MMHG | WEIGHT: 289.9 LBS | HEIGHT: 70 IN | OXYGEN SATURATION: 96 % | HEART RATE: 66 BPM | TEMPERATURE: 97.9 F | RESPIRATION RATE: 16 BRPM

## 2023-10-24 DIAGNOSIS — Z13.6 ENCOUNTER FOR ABDOMINAL AORTIC ANEURYSM (AAA) SCREENING: ICD-10-CM

## 2023-10-24 DIAGNOSIS — E78.5 DYSLIPIDEMIA: ICD-10-CM

## 2023-10-24 DIAGNOSIS — E11.9 TYPE 2 DIABETES MELLITUS WITHOUT COMPLICATION, WITHOUT LONG-TERM CURRENT USE OF INSULIN (HCC): ICD-10-CM

## 2023-10-24 LAB
HBA1C MFR BLD: 6.9 % (ref ?–5.8)
POCT INT CON NEG: NEGATIVE
POCT INT CON POS: POSITIVE

## 2023-10-24 PROCEDURE — 3079F DIAST BP 80-89 MM HG: CPT | Performed by: PHYSICIAN ASSISTANT

## 2023-10-24 PROCEDURE — 3074F SYST BP LT 130 MM HG: CPT | Performed by: PHYSICIAN ASSISTANT

## 2023-10-24 PROCEDURE — 83036 HEMOGLOBIN GLYCOSYLATED A1C: CPT | Performed by: PHYSICIAN ASSISTANT

## 2023-10-24 PROCEDURE — 99214 OFFICE O/P EST MOD 30 MIN: CPT | Performed by: PHYSICIAN ASSISTANT

## 2023-10-24 NOTE — PROGRESS NOTES
"CC:  Chief Complaint   Patient presents with    Follow-Up     6 month check up       HISTORY OF PRESENT ILLNESS: Patient is a 63 y.o. male established patient presenting with issues below  Patient's hemoglobin A1c today at 6.9%.  Not currently on any medications for it.  Patient continues with Lipitor 20 mg for his dyslipidemia.  Patient's weight has increased 10 pounds since his last visit about 6 months ago.    Current Outpatient Medications   Medication Sig Dispense Refill    atorvastatin (LIPITOR) 20 MG Tab TAKE 1 TABLET DAILY 90 Tablet 3     No current facility-administered medications for this visit.        ROS:     ROS    Exam:    /80   Pulse 66   Temp 36.6 °C (97.9 °F) (Temporal)   Resp 16   Ht 1.778 m (5' 10\")   Wt (!) 131 kg (289 lb 14.4 oz)   SpO2 96%  Body mass index is 41.6 kg/m².    General:  Well nourished, well developed male in NAD  Head is grossly normal.  Neck: Supple.   Pulmonary: Clear to auscultation. No ronchi, wheezing or rales  Cardiac: Regular rate and rhythm. No murmurs.  Skin: Warm and dry. No obvious lesions  Neuro: Normal muscle tone. Gait normal. Alert and oriented.  Psych: Normal mood and affect      Please note that this dictation was created using voice recognition software. I have made every reasonable attempt to correct obvious errors, but I expect that there are errors of grammar and possibly content that I did not discover before finalizing the note.        Assessment/Plan:    1. Type 2 diabetes mellitus without complication, without long-term current use of insulin (HCC)  Adequately controlled with lifestyle at this point.  Advised that he is still okay to continue without medication   - POCT Hemoglobin A1C  - CBC WITH DIFFERENTIAL; Future  - Comp Metabolic Panel; Future  - HEMOGLOBIN A1C; Future  - MICROALBUMIN CREAT RATIO URINE; Future    2. Encounter for abdominal aortic aneurysm (AAA) screening    - US-ABDOMINAL AORTA W/O DOPPLER; Future    3. " Dyslipidemia  Continue Lipitor 20 mg.  - Lipid Profile; Future

## 2023-10-30 ENCOUNTER — HOSPITAL ENCOUNTER (OUTPATIENT)
Dept: RADIOLOGY | Facility: MEDICAL CENTER | Age: 63
End: 2023-10-30
Attending: PHYSICIAN ASSISTANT
Payer: MEDICARE

## 2023-10-30 DIAGNOSIS — Z13.6 ENCOUNTER FOR ABDOMINAL AORTIC ANEURYSM (AAA) SCREENING: ICD-10-CM

## 2023-10-30 PROCEDURE — 76775 US EXAM ABDO BACK WALL LIM: CPT

## 2024-02-09 ENCOUNTER — OFFICE VISIT (OUTPATIENT)
Dept: MEDICAL GROUP | Facility: PHYSICIAN GROUP | Age: 64
End: 2024-02-09
Payer: MEDICARE

## 2024-02-09 VITALS
DIASTOLIC BLOOD PRESSURE: 74 MMHG | TEMPERATURE: 97.2 F | BODY MASS INDEX: 42.66 KG/M2 | WEIGHT: 298 LBS | SYSTOLIC BLOOD PRESSURE: 118 MMHG | HEIGHT: 70 IN | OXYGEN SATURATION: 93 % | RESPIRATION RATE: 16 BRPM | HEART RATE: 76 BPM

## 2024-02-09 DIAGNOSIS — L98.9 SKIN LESION: ICD-10-CM

## 2024-02-09 DIAGNOSIS — Z23 NEED FOR VACCINATION: ICD-10-CM

## 2024-02-09 DIAGNOSIS — E78.5 DYSLIPIDEMIA: ICD-10-CM

## 2024-02-09 PROCEDURE — 90472 IMMUNIZATION ADMIN EACH ADD: CPT | Performed by: PHYSICIAN ASSISTANT

## 2024-02-09 PROCEDURE — G0010 ADMIN HEPATITIS B VACCINE: HCPCS | Performed by: PHYSICIAN ASSISTANT

## 2024-02-09 PROCEDURE — 90746 HEPB VACCINE 3 DOSE ADULT IM: CPT | Performed by: PHYSICIAN ASSISTANT

## 2024-02-09 PROCEDURE — 99213 OFFICE O/P EST LOW 20 MIN: CPT | Mod: 25 | Performed by: PHYSICIAN ASSISTANT

## 2024-02-09 PROCEDURE — 11102 TANGNTL BX SKIN SINGLE LES: CPT | Performed by: PHYSICIAN ASSISTANT

## 2024-02-09 PROCEDURE — 3074F SYST BP LT 130 MM HG: CPT | Performed by: PHYSICIAN ASSISTANT

## 2024-02-09 PROCEDURE — 90632 HEPA VACCINE ADULT IM: CPT | Performed by: PHYSICIAN ASSISTANT

## 2024-02-09 PROCEDURE — 3078F DIAST BP <80 MM HG: CPT | Performed by: PHYSICIAN ASSISTANT

## 2024-02-09 RX ORDER — ATORVASTATIN CALCIUM 20 MG/1
20 TABLET, FILM COATED ORAL DAILY
Qty: 90 TABLET | Refills: 3 | Status: SHIPPED | OUTPATIENT
Start: 2024-02-09

## 2024-02-09 ASSESSMENT — PATIENT HEALTH QUESTIONNAIRE - PHQ9: CLINICAL INTERPRETATION OF PHQ2 SCORE: 0

## 2024-02-09 NOTE — PROGRESS NOTES
"CC:  Chief Complaint   Patient presents with    Follow-Up     Skin tag removal     Medication Refill     atorvastatin       HISTORY OF PRESENT ILLNESS: Patient is a 63 y.o. male established patient presenting with issues below  Pt presenting for skin lesion removal over his L cheek.     Current Outpatient Medications   Medication Sig Dispense Refill    atorvastatin (LIPITOR) 20 MG Tab TAKE 1 TABLET DAILY 90 Tablet 3     No current facility-administered medications for this visit.        ROS:     ROS    Exam:    /74   Pulse 76   Temp 36.2 °C (97.2 °F) (Temporal)   Resp 16   Ht 1.778 m (5' 10\")   Wt (!) 135 kg (298 lb)   SpO2 93%  Body mass index is 42.76 kg/m².    General:  Well nourished, well developed male in NAD  HEENT: Positive for pedunclated lesion over L infraorbital region  Neck: Supple.   Skin: Warm and dry. No obvious lesions  Neuro: Normal muscle tone. Gait normal. Alert and oriented.  Psych: Normal mood and affect      Please note that this dictation was created using voice recognition software. I have made every reasonable attempt to correct obvious errors, but I expect that there are errors of grammar and possibly content that I did not discover before finalizing the note.        Assessment/Plan:    1. Need for vaccination    - Hepatitis B Vaccine Adult 20+  - Hep A Adult 19+    2. Dyslipidemia    - atorvastatin (LIPITOR) 20 MG Tab; Take 1 Tablet by mouth every day.  Dispense: 90 Tablet; Refill: 3    3. Skin lesion      The outlined procedures for excision of this skin lesion and biopsy were discussed in detail. Risks including bleeding, infection, scarring and possible need for reexcision discussed. Patient agreed to proceed with procedure. The 1 lesion was prepped with Betadine. 1% Xylocaine without epinephrine was used to anesthetize all lesions. Lesions were shaved tangentially using a #15 blade. Lesion covered with gauze dressing.  Wound care was discussed. No complications.  Return to " office if signs and symptoms of infection develop. Patient instructed to avoid standing water such as bath tubs or pools until skin heals over.

## 2024-03-08 ENCOUNTER — NON-PROVIDER VISIT (OUTPATIENT)
Dept: MEDICAL GROUP | Facility: PHYSICIAN GROUP | Age: 64
End: 2024-03-08
Payer: MEDICARE

## 2024-03-08 DIAGNOSIS — Z23 NEED FOR VACCINATION: ICD-10-CM

## 2024-03-08 DIAGNOSIS — E11.9 TYPE 2 DIABETES MELLITUS WITHOUT COMPLICATION, WITHOUT LONG-TERM CURRENT USE OF INSULIN (HCC): ICD-10-CM

## 2024-03-08 PROCEDURE — 90746 HEPB VACCINE 3 DOSE ADULT IM: CPT | Performed by: PHYSICIAN ASSISTANT

## 2024-03-08 PROCEDURE — G0010 ADMIN HEPATITIS B VACCINE: HCPCS | Performed by: PHYSICIAN ASSISTANT

## 2024-03-08 NOTE — PROGRESS NOTES
"Leo Laughlin is a 63 y.o. male here for a non-provider visit for:   HEPATITIS B 2 of 3    Reason for immunization: continue or complete series started at the office  Immunization records indicate need for vaccine: Yes, confirmed with Epic  Minimum interval has been met for this vaccine: Yes  ABN completed: Not Indicated    VIS Dated  10/15/2021 was given to patient: Yes  All IAC Questionnaire questions were answered \"No.\"    Patient tolerated injection and no adverse effects were observed or reported: Yes    Pt scheduled for next dose in series: Yes  "

## 2024-04-24 ENCOUNTER — OFFICE VISIT (OUTPATIENT)
Dept: MEDICAL GROUP | Facility: PHYSICIAN GROUP | Age: 64
End: 2024-04-24
Payer: MEDICARE

## 2024-04-24 VITALS
HEIGHT: 70 IN | DIASTOLIC BLOOD PRESSURE: 72 MMHG | HEART RATE: 67 BPM | WEIGHT: 291 LBS | RESPIRATION RATE: 16 BRPM | TEMPERATURE: 97.1 F | BODY MASS INDEX: 41.66 KG/M2 | SYSTOLIC BLOOD PRESSURE: 126 MMHG | OXYGEN SATURATION: 94 %

## 2024-04-24 DIAGNOSIS — E78.5 DYSLIPIDEMIA: ICD-10-CM

## 2024-04-24 DIAGNOSIS — K76.0 FATTY LIVER: ICD-10-CM

## 2024-04-24 DIAGNOSIS — E11.9 TYPE 2 DIABETES MELLITUS WITHOUT COMPLICATION, WITHOUT LONG-TERM CURRENT USE OF INSULIN (HCC): ICD-10-CM

## 2024-04-24 PROBLEM — D70.9 NEUTROPENIA (HCC): Status: RESOLVED | Noted: 2018-11-22 | Resolved: 2024-04-24

## 2024-04-24 PROCEDURE — 3078F DIAST BP <80 MM HG: CPT | Performed by: PHYSICIAN ASSISTANT

## 2024-04-24 PROCEDURE — 99214 OFFICE O/P EST MOD 30 MIN: CPT | Performed by: PHYSICIAN ASSISTANT

## 2024-04-24 PROCEDURE — 3074F SYST BP LT 130 MM HG: CPT | Performed by: PHYSICIAN ASSISTANT

## 2024-04-24 NOTE — PROGRESS NOTES
"CC:  Chief Complaint   Patient presents with    Lab Results       HISTORY OF PRESENT ILLNESS: Patient is a 63 y.o. male established patient presenting with issues below  Patient's recent labs reveal that his hemoglobin A1c has increased from 6.1% about 6 months ago to 7.9%.  He is currently been on dietary control for this.  Today he is requesting to have nutrition consult for his diabetes.  His LFTs continue to be elevated but the levels are essentially unchanged from when previously checked about 2 years ago.  Has a known history of fatty liver.  Lipids very well-controlled with Lipitor.  LDL is at goal but HDL is also    Current Outpatient Medications   Medication Sig Dispense Refill    atorvastatin (LIPITOR) 20 MG Tab Take 1 Tablet by mouth every day. 90 Tablet 3     No current facility-administered medications for this visit.        ROS:     ROS    Exam:    /72   Pulse 67   Temp 36.2 °C (97.1 °F) (Temporal)   Resp 16   Ht 1.778 m (5' 10\")   Wt (!) 132 kg (291 lb)   SpO2 94%  Body mass index is 41.75 kg/m².    General:  Well nourished, well developed male in NAD  Head is grossly normal.  Neck: Supple.   Pulmonary: Clear to auscultation. No ronchi, wheezing or rales  Cardiac: Regular rate and rhythm. No murmurs.  Skin: Warm and dry. No obvious lesions  Neuro: Normal muscle tone. Gait normal. Alert and oriented.  Psych: Normal mood and affect    Monofilament testing with a 10 gram force: sensation intact: intact bilaterally  Visual Inspection: Feet without maceration, ulcers, fissures.  Pedal pulses: intact bilaterally    Please note that this dictation was created using voice recognition software. I have made every reasonable attempt to correct obvious errors, but I expect that there are errors of grammar and possibly content that I did not discover before finalizing the note.        Assessment/Plan:      1. Type 2 diabetes mellitus without complication, without long-term current use of insulin " (HCC)  Referral to diabetic education placed.  Recheck hemoglobin A1c in 3 months  - Referral to Diabetic Education  - Diabetic Monofilament LE Exam    2. Fatty liver  Continue to work towards weight loss    3. Dyslipidemia  Continue Lipitor 20 mg.

## 2024-07-24 ENCOUNTER — OFFICE VISIT (OUTPATIENT)
Dept: MEDICAL GROUP | Facility: PHYSICIAN GROUP | Age: 64
End: 2024-07-24
Payer: MEDICARE

## 2024-07-24 VITALS
SYSTOLIC BLOOD PRESSURE: 110 MMHG | RESPIRATION RATE: 16 BRPM | WEIGHT: 282 LBS | DIASTOLIC BLOOD PRESSURE: 84 MMHG | BODY MASS INDEX: 40.37 KG/M2 | HEIGHT: 70 IN | OXYGEN SATURATION: 94 % | TEMPERATURE: 97 F | HEART RATE: 60 BPM

## 2024-07-24 DIAGNOSIS — E11.9 TYPE 2 DIABETES MELLITUS WITHOUT COMPLICATION, WITHOUT LONG-TERM CURRENT USE OF INSULIN (HCC): ICD-10-CM

## 2024-07-24 LAB
HBA1C MFR BLD: 6.2 % (ref ?–5.8)
POCT INT CON NEG: NEGATIVE
POCT INT CON POS: POSITIVE

## 2024-07-24 PROCEDURE — 3079F DIAST BP 80-89 MM HG: CPT | Performed by: PHYSICIAN ASSISTANT

## 2024-07-24 PROCEDURE — 99213 OFFICE O/P EST LOW 20 MIN: CPT | Performed by: PHYSICIAN ASSISTANT

## 2024-07-24 PROCEDURE — 83036 HEMOGLOBIN GLYCOSYLATED A1C: CPT | Performed by: PHYSICIAN ASSISTANT

## 2024-07-24 PROCEDURE — 3074F SYST BP LT 130 MM HG: CPT | Performed by: PHYSICIAN ASSISTANT

## 2024-08-13 ENCOUNTER — NON-PROVIDER VISIT (OUTPATIENT)
Dept: MEDICAL GROUP | Facility: PHYSICIAN GROUP | Age: 64
End: 2024-08-13
Payer: MEDICARE

## 2024-08-13 DIAGNOSIS — E11.9 TYPE 2 DIABETES MELLITUS WITHOUT COMPLICATION, WITHOUT LONG-TERM CURRENT USE OF INSULIN (HCC): ICD-10-CM

## 2024-08-13 DIAGNOSIS — Z23 NEED FOR VACCINATION: ICD-10-CM

## 2024-08-13 PROCEDURE — G0010 ADMIN HEPATITIS B VACCINE: HCPCS | Performed by: PHYSICIAN ASSISTANT

## 2024-08-13 PROCEDURE — 90746 HEPB VACCINE 3 DOSE ADULT IM: CPT | Performed by: PHYSICIAN ASSISTANT

## 2024-08-13 NOTE — PROGRESS NOTES
"Leo Laughlin is a 63 y.o. male here for a non-provider visit for:   HEPATITIS B 3 of 3    Reason for immunization: continue or complete series started at the office  Immunization records indicate need for vaccine: Yes, confirmed with Epic  Minimum interval has been met for this vaccine: Yes  ABN completed: Yes    VIS Dated  5/12/2023 was given to patient: Yes  All IAC Questionnaire questions were answered \"No.\"    Patient tolerated injection and no adverse effects were observed or reported: Yes    Pt scheduled for next dose in series: No, series completed   "

## 2024-09-09 RX ORDER — CIPROFLOXACIN 500 MG/1
500 TABLET, FILM COATED ORAL 2 TIMES DAILY
Qty: 10 TABLET | Refills: 0 | Status: SHIPPED | OUTPATIENT
Start: 2024-09-09

## 2025-01-15 ENCOUNTER — PATIENT MESSAGE (OUTPATIENT)
Dept: MEDICAL GROUP | Facility: PHYSICIAN GROUP | Age: 65
End: 2025-01-15
Payer: COMMERCIAL

## 2025-01-15 DIAGNOSIS — E11.9 TYPE 2 DIABETES MELLITUS WITHOUT COMPLICATION, WITHOUT LONG-TERM CURRENT USE OF INSULIN (HCC): ICD-10-CM

## 2025-01-15 DIAGNOSIS — Z00.00 PHYSICAL EXAM, ANNUAL: ICD-10-CM

## 2025-01-24 DIAGNOSIS — E78.5 DYSLIPIDEMIA: ICD-10-CM

## 2025-01-27 RX ORDER — ATORVASTATIN CALCIUM 20 MG/1
20 TABLET, FILM COATED ORAL DAILY
Qty: 90 TABLET | Refills: 3 | Status: SHIPPED | OUTPATIENT
Start: 2025-01-27

## 2025-01-29 LAB — HBA1C MFR BLD: 5.9 % (ref ?–5.8)

## 2025-01-30 ENCOUNTER — OFFICE VISIT (OUTPATIENT)
Dept: MEDICAL GROUP | Facility: PHYSICIAN GROUP | Age: 65
End: 2025-01-30
Payer: MEDICARE

## 2025-01-30 VITALS
HEIGHT: 70 IN | TEMPERATURE: 97.5 F | WEIGHT: 255 LBS | OXYGEN SATURATION: 94 % | DIASTOLIC BLOOD PRESSURE: 72 MMHG | RESPIRATION RATE: 12 BRPM | SYSTOLIC BLOOD PRESSURE: 118 MMHG | HEART RATE: 59 BPM | BODY MASS INDEX: 36.51 KG/M2

## 2025-01-30 DIAGNOSIS — R21 RASH OF FACE: ICD-10-CM

## 2025-01-30 DIAGNOSIS — E78.5 DYSLIPIDEMIA: ICD-10-CM

## 2025-01-30 DIAGNOSIS — E11.9 TYPE 2 DIABETES MELLITUS WITHOUT COMPLICATION, WITHOUT LONG-TERM CURRENT USE OF INSULIN (HCC): ICD-10-CM

## 2025-01-30 NOTE — PROGRESS NOTES
"CC:  Chief Complaint   Patient presents with    Referral Needed     Dermatology        HISTORY OF PRESENT ILLNESS: Patient is a 64 y.o. male established patient presenting with issues below  Pt requesting dermatology referral for rash that periodically breaks out over his face.  Patient is continue to see a nutritionist and has helped him lose about 50 pounds.  His hemoglobin A1c has also reduced down to 5.9%.  Questioning whether he needs to be on Lipitor at this point.    Current Outpatient Medications   Medication Sig Dispense Refill    atorvastatin (LIPITOR) 20 MG Tab TAKE 1 TABLET DAILY 90 Tablet 3    ciprofloxacin (CIPRO) 500 MG Tab Take 1 Tablet by mouth 2 times a day. 10 Tablet 0     No current facility-administered medications for this visit.        ROS:     ROS    Exam:    /72   Pulse (!) 59   Temp 36.4 °C (97.5 °F) (Temporal)   Resp 12   Ht 1.778 m (5' 10\")   Wt 116 kg (255 lb)   SpO2 94%  Body mass index is 36.59 kg/m².    General:  Well nourished, well developed male in NAD  Head is grossly normal.  Neck: Supple.   Pulmonary: Clear to auscultation. No ronchi, wheezing or rales  Cardiac: Regular rate and rhythm. No murmurs.  Skin: Warm and dry.  Positive for scaly papular rash over forehead and cheeks.  Neuro: Normal muscle tone. Gait normal. Alert and oriented.  Psych: Normal mood and affect      Please note that this dictation was created using voice recognition software. I have made every reasonable attempt to correct obvious errors, but I expect that there are errors of grammar and possibly content that I did not discover before finalizing the note.        Assessment/Plan:    1. Type 2 diabetes mellitus without complication, without long-term current use of insulin (HCC)  Substantially improved with dietary changes.  Continue to follow-up with nutritionist    2. Rash of face  Referral placed  - Referral to Dermatology    3. Dyslipidemia  Will get coronary artery calcium score and review " when available.  - CT-CARDIAC SCORING; Future

## 2025-02-07 ENCOUNTER — HOSPITAL ENCOUNTER (OUTPATIENT)
Dept: RADIOLOGY | Facility: MEDICAL CENTER | Age: 65
End: 2025-02-07
Attending: PHYSICIAN ASSISTANT
Payer: COMMERCIAL

## 2025-02-07 DIAGNOSIS — E78.5 DYSLIPIDEMIA: ICD-10-CM

## 2025-02-07 PROCEDURE — 4410556 CT-CARDIAC SCORING (SELF PAY ONLY)

## 2025-02-09 ENCOUNTER — APPOINTMENT (OUTPATIENT)
Dept: RADIOLOGY | Facility: MEDICAL CENTER | Age: 65
End: 2025-02-09
Attending: PHYSICIAN ASSISTANT
Payer: COMMERCIAL

## 2025-07-30 ENCOUNTER — OFFICE VISIT (OUTPATIENT)
Dept: MEDICAL GROUP | Facility: PHYSICIAN GROUP | Age: 65
End: 2025-07-30
Payer: COMMERCIAL

## 2025-07-30 VITALS
WEIGHT: 268 LBS | DIASTOLIC BLOOD PRESSURE: 80 MMHG | HEART RATE: 67 BPM | BODY MASS INDEX: 38.37 KG/M2 | OXYGEN SATURATION: 93 % | RESPIRATION RATE: 12 BRPM | SYSTOLIC BLOOD PRESSURE: 108 MMHG | TEMPERATURE: 97.4 F | HEIGHT: 70 IN

## 2025-07-30 DIAGNOSIS — E11.9 TYPE 2 DIABETES MELLITUS WITHOUT COMPLICATION, WITHOUT LONG-TERM CURRENT USE OF INSULIN (HCC): Primary | ICD-10-CM

## 2025-07-30 DIAGNOSIS — R93.1 AGATSTON CAC SCORE, <100: ICD-10-CM

## 2025-07-30 LAB
HBA1C MFR BLD: 5.7 % (ref ?–5.8)
POCT INT CON NEG: NEGATIVE
POCT INT CON POS: POSITIVE

## 2025-07-30 NOTE — PROGRESS NOTES
"CC:  Chief Complaint   Patient presents with    Diabetes Follow-up       HISTORY OF PRESENT ILLNESS: Patient is a 64 y.o. male established patient presenting with issues below  Pt's A1C at 5.7%. He has continued with dietary improvements for his DM control  Pt's CAC score at 0    Current Medications[1]     ROS:     ROS    Exam:    /80   Pulse 67   Temp 36.3 °C (97.4 °F) (Temporal)   Resp 12   Ht 1.778 m (5' 10\")   Wt 122 kg (268 lb)   SpO2 93%  Body mass index is 38.45 kg/m².    General:  Well nourished, well developed male in NAD  Head is grossly normal.  Neck: Supple.   Skin: Warm and dry. No obvious lesions  Neuro: Normal muscle tone. Gait normal. Alert and oriented.  Psych: Normal mood and affect    Monofilament testing with a 10 gram force: sensation intact: intact bilaterally  Visual Inspection: Feet without maceration, ulcers, fissures.  Pedal pulses: intact bilaterally    Please note that this dictation was created using voice recognition software. I have made every reasonable attempt to correct obvious errors, but I expect that there are errors of grammar and possibly content that I did not discover before finalizing the note.        Assessment/Plan:    1. Type 2 diabetes mellitus without complication, without long-term current use of insulin (HCC) (Primary)  Very well controlled.   - Diabetic Monofilament LE Exam  - POCT A1C  - CBC WITH DIFFERENTIAL; Future  - Comp Metabolic Panel; Future  - Lipid Profile; Future  - HEMOGLOBIN A1C; Future  - MICROALBUMIN CREAT RATIO URINE; Future    2. Agatston CAC score, <100  Fine to stop statin therapy                  [1]   Current Outpatient Medications   Medication Sig Dispense Refill    atorvastatin (LIPITOR) 20 MG Tab TAKE 1 TABLET DAILY 90 Tablet 3     No current facility-administered medications for this visit.     "